# Patient Record
Sex: MALE | Race: WHITE | NOT HISPANIC OR LATINO | Employment: OTHER | URBAN - METROPOLITAN AREA
[De-identification: names, ages, dates, MRNs, and addresses within clinical notes are randomized per-mention and may not be internally consistent; named-entity substitution may affect disease eponyms.]

---

## 2023-02-09 PROBLEM — K21.9 GASTROESOPHAGEAL REFLUX DISEASE WITHOUT ESOPHAGITIS: Status: ACTIVE | Noted: 2023-02-09

## 2023-02-09 PROBLEM — L82.1 SEBORRHEIC KERATOSES: Status: ACTIVE | Noted: 2023-02-09

## 2023-02-09 PROBLEM — F41.9 ANXIETY, MILD: Status: ACTIVE | Noted: 2023-02-09

## 2023-02-09 PROBLEM — G43.009 MIGRAINE WITHOUT AURA AND WITHOUT STATUS MIGRAINOSUS, NOT INTRACTABLE: Status: ACTIVE | Noted: 2023-02-09

## 2023-02-09 PROBLEM — I10 ESSENTIAL HYPERTENSION: Status: ACTIVE | Noted: 2023-02-09

## 2023-02-09 PROBLEM — U07.1 COVID-19: Status: ACTIVE | Noted: 2023-02-09

## 2023-02-09 PROBLEM — M15.9 GENERALIZED OSTEOARTHRITIS OF MULTIPLE SITES: Status: ACTIVE | Noted: 2023-02-09

## 2023-02-09 PROBLEM — E66.3 OVERWEIGHT WITH BODY MASS INDEX (BMI) OF 29 TO 29.9 IN ADULT: Status: ACTIVE | Noted: 2023-02-09

## 2023-02-09 PROBLEM — E11.65 TYPE 2 DIABETES MELLITUS WITH HYPERGLYCEMIA, WITHOUT LONG-TERM CURRENT USE OF INSULIN (MULTI): Status: ACTIVE | Noted: 2023-02-09

## 2023-02-09 PROBLEM — E78.5 DYSLIPIDEMIA, GOAL LDL BELOW 100: Status: ACTIVE | Noted: 2023-02-09

## 2023-02-09 PROBLEM — M75.81 ROTATOR CUFF TENDONITIS, RIGHT: Status: ACTIVE | Noted: 2023-02-09

## 2023-02-09 PROBLEM — K63.5 BENIGN COLON POLYP: Status: ACTIVE | Noted: 2023-02-09

## 2023-02-09 PROBLEM — M75.82 TENDINITIS OF LEFT ROTATOR CUFF: Status: ACTIVE | Noted: 2023-02-09

## 2023-02-09 RX ORDER — ASPIRIN 81 MG/1
1 TABLET ORAL DAILY
COMMUNITY

## 2023-02-09 RX ORDER — OMEPRAZOLE 20 MG/1
20 CAPSULE, DELAYED RELEASE ORAL
COMMUNITY
Start: 2020-09-02

## 2023-02-09 RX ORDER — LOSARTAN POTASSIUM 100 MG/1
1 TABLET ORAL DAILY
COMMUNITY
Start: 2020-07-19 | End: 2023-09-22 | Stop reason: SDUPTHER

## 2023-02-09 RX ORDER — METFORMIN HYDROCHLORIDE 500 MG/1
1 TABLET ORAL
COMMUNITY
Start: 2020-09-08

## 2023-02-09 RX ORDER — SUMATRIPTAN SUCCINATE 25 MG/1
25 TABLET ORAL DAILY PRN
COMMUNITY

## 2023-02-09 RX ORDER — CHLORTHALIDONE 25 MG/1
1 TABLET ORAL DAILY
COMMUNITY
Start: 2020-09-08 | End: 2023-09-22 | Stop reason: SDUPTHER

## 2023-02-09 RX ORDER — ATORVASTATIN CALCIUM 10 MG/1
1 TABLET, FILM COATED ORAL DAILY
COMMUNITY
Start: 2020-07-19 | End: 2023-09-22 | Stop reason: SDUPTHER

## 2023-02-09 RX ORDER — SERTRALINE HYDROCHLORIDE 50 MG/1
1 TABLET, FILM COATED ORAL DAILY
COMMUNITY
Start: 2021-04-14 | End: 2024-01-22

## 2023-03-15 LAB
ALANINE AMINOTRANSFERASE (SGPT) (U/L) IN SER/PLAS: 7 U/L (ref 10–52)
ALBUMIN (G/DL) IN SER/PLAS: 4.3 G/DL (ref 3.4–5)
ALKALINE PHOSPHATASE (U/L) IN SER/PLAS: 79 U/L (ref 33–136)
ANION GAP IN SER/PLAS: 10 MMOL/L (ref 10–20)
ASPARTATE AMINOTRANSFERASE (SGOT) (U/L) IN SER/PLAS: 15 U/L (ref 9–39)
BILIRUBIN TOTAL (MG/DL) IN SER/PLAS: 1.1 MG/DL (ref 0–1.2)
CALCIUM (MG/DL) IN SER/PLAS: 9.6 MG/DL (ref 8.6–10.3)
CARBON DIOXIDE, TOTAL (MMOL/L) IN SER/PLAS: 31 MMOL/L (ref 21–32)
CHLORIDE (MMOL/L) IN SER/PLAS: 103 MMOL/L (ref 98–107)
CHOLESTEROL (MG/DL) IN SER/PLAS: 116 MG/DL (ref 0–199)
CHOLESTEROL IN HDL (MG/DL) IN SER/PLAS: 37 MG/DL
CHOLESTEROL/HDL RATIO: 3.1
COBALAMIN (VITAMIN B12) (PG/ML) IN SER/PLAS: 234 PG/ML (ref 211–911)
CREATININE (MG/DL) IN SER/PLAS: 0.99 MG/DL (ref 0.5–1.3)
ESTIMATED AVERAGE GLUCOSE FOR HBA1C: 134 MG/DL
GFR MALE: 79 ML/MIN/1.73M2
GLUCOSE (MG/DL) IN SER/PLAS: 124 MG/DL (ref 74–99)
HEMOGLOBIN A1C/HEMOGLOBIN TOTAL IN BLOOD: 6.3 %
LDL: 61 MG/DL (ref 0–99)
POTASSIUM (MMOL/L) IN SER/PLAS: 3.8 MMOL/L (ref 3.5–5.3)
PROTEIN TOTAL: 7 G/DL (ref 6.4–8.2)
SODIUM (MMOL/L) IN SER/PLAS: 140 MMOL/L (ref 136–145)
TRIGLYCERIDE (MG/DL) IN SER/PLAS: 88 MG/DL (ref 0–149)
UREA NITROGEN (MG/DL) IN SER/PLAS: 16 MG/DL (ref 6–23)
VLDL: 18 MG/DL (ref 0–40)

## 2023-03-22 ENCOUNTER — OFFICE VISIT (OUTPATIENT)
Dept: PRIMARY CARE | Facility: CLINIC | Age: 73
End: 2023-03-22
Payer: MEDICARE

## 2023-03-22 VITALS
WEIGHT: 188 LBS | DIASTOLIC BLOOD PRESSURE: 80 MMHG | HEIGHT: 66 IN | RESPIRATION RATE: 16 BRPM | BODY MASS INDEX: 30.22 KG/M2 | SYSTOLIC BLOOD PRESSURE: 130 MMHG | HEART RATE: 66 BPM

## 2023-03-22 DIAGNOSIS — G43.009 MIGRAINE WITHOUT AURA AND WITHOUT STATUS MIGRAINOSUS, NOT INTRACTABLE: ICD-10-CM

## 2023-03-22 DIAGNOSIS — K21.9 GASTROESOPHAGEAL REFLUX DISEASE WITHOUT ESOPHAGITIS: ICD-10-CM

## 2023-03-22 DIAGNOSIS — E78.5 DYSLIPIDEMIA, GOAL LDL BELOW 100: ICD-10-CM

## 2023-03-22 DIAGNOSIS — Z00.00 MEDICARE ANNUAL WELLNESS VISIT, SUBSEQUENT: Primary | ICD-10-CM

## 2023-03-22 DIAGNOSIS — E11.65 TYPE 2 DIABETES MELLITUS WITH HYPERGLYCEMIA, WITHOUT LONG-TERM CURRENT USE OF INSULIN (MULTI): ICD-10-CM

## 2023-03-22 DIAGNOSIS — I10 ESSENTIAL HYPERTENSION: ICD-10-CM

## 2023-03-22 DIAGNOSIS — M15.9 GENERALIZED OSTEOARTHRITIS OF MULTIPLE SITES: ICD-10-CM

## 2023-03-22 DIAGNOSIS — F41.9 ANXIETY, MILD: ICD-10-CM

## 2023-03-22 DIAGNOSIS — K63.5 BENIGN COLON POLYP: ICD-10-CM

## 2023-03-22 PROBLEM — E66.09 CLASS 1 OBESITY DUE TO EXCESS CALORIES WITH SERIOUS COMORBIDITY AND BODY MASS INDEX (BMI) OF 30.0 TO 30.9 IN ADULT: Status: ACTIVE | Noted: 2023-03-22

## 2023-03-22 PROBLEM — E66.3 OVERWEIGHT WITH BODY MASS INDEX (BMI) OF 29 TO 29.9 IN ADULT: Status: RESOLVED | Noted: 2023-02-09 | Resolved: 2023-03-22

## 2023-03-22 PROBLEM — E66.811 CLASS 1 OBESITY DUE TO EXCESS CALORIES WITH SERIOUS COMORBIDITY AND BODY MASS INDEX (BMI) OF 30.0 TO 30.9 IN ADULT: Status: ACTIVE | Noted: 2023-03-22

## 2023-03-22 PROCEDURE — G0442 ANNUAL ALCOHOL SCREEN 15 MIN: HCPCS | Performed by: INTERNAL MEDICINE

## 2023-03-22 PROCEDURE — 1170F FXNL STATUS ASSESSED: CPT | Performed by: INTERNAL MEDICINE

## 2023-03-22 PROCEDURE — 1036F TOBACCO NON-USER: CPT | Performed by: INTERNAL MEDICINE

## 2023-03-22 PROCEDURE — G0439 PPPS, SUBSEQ VISIT: HCPCS | Performed by: INTERNAL MEDICINE

## 2023-03-22 PROCEDURE — 99214 OFFICE O/P EST MOD 30 MIN: CPT | Performed by: INTERNAL MEDICINE

## 2023-03-22 PROCEDURE — G0447 BEHAVIOR COUNSEL OBESITY 15M: HCPCS | Performed by: INTERNAL MEDICINE

## 2023-03-22 PROCEDURE — 4010F ACE/ARB THERAPY RXD/TAKEN: CPT | Performed by: INTERNAL MEDICINE

## 2023-03-22 PROCEDURE — 99497 ADVNCD CARE PLAN 30 MIN: CPT | Performed by: INTERNAL MEDICINE

## 2023-03-22 PROCEDURE — 3079F DIAST BP 80-89 MM HG: CPT | Performed by: INTERNAL MEDICINE

## 2023-03-22 PROCEDURE — 1159F MED LIST DOCD IN RCRD: CPT | Performed by: INTERNAL MEDICINE

## 2023-03-22 PROCEDURE — 3075F SYST BP GE 130 - 139MM HG: CPT | Performed by: INTERNAL MEDICINE

## 2023-03-22 PROCEDURE — G0444 DEPRESSION SCREEN ANNUAL: HCPCS | Performed by: INTERNAL MEDICINE

## 2023-03-22 PROCEDURE — 3044F HG A1C LEVEL LT 7.0%: CPT | Performed by: INTERNAL MEDICINE

## 2023-03-22 PROCEDURE — 1160F RVW MEDS BY RX/DR IN RCRD: CPT | Performed by: INTERNAL MEDICINE

## 2023-03-22 ASSESSMENT — ANXIETY QUESTIONNAIRES
3. WORRYING TOO MUCH ABOUT DIFFERENT THINGS: NOT AT ALL
6. BECOMING EASILY ANNOYED OR IRRITABLE: NOT AT ALL
4. TROUBLE RELAXING: NOT AT ALL
2. NOT BEING ABLE TO STOP OR CONTROL WORRYING: NOT AT ALL
GAD7 TOTAL SCORE: 0
7. FEELING AFRAID AS IF SOMETHING AWFUL MIGHT HAPPEN: NOT AT ALL
1. FEELING NERVOUS, ANXIOUS, OR ON EDGE: NOT AT ALL
IF YOU CHECKED OFF ANY PROBLEMS ON THIS QUESTIONNAIRE, HOW DIFFICULT HAVE THESE PROBLEMS MADE IT FOR YOU TO DO YOUR WORK, TAKE CARE OF THINGS AT HOME, OR GET ALONG WITH OTHER PEOPLE: NOT DIFFICULT AT ALL
5. BEING SO RESTLESS THAT IT IS HARD TO SIT STILL: NOT AT ALL

## 2023-03-22 ASSESSMENT — ACTIVITIES OF DAILY LIVING (ADL)
MANAGING_FINANCES: INDEPENDENT
GROCERY_SHOPPING: INDEPENDENT
BATHING: INDEPENDENT
DOING_HOUSEWORK: INDEPENDENT
DRESSING: INDEPENDENT
TAKING_MEDICATION: INDEPENDENT

## 2023-03-22 ASSESSMENT — PATIENT HEALTH QUESTIONNAIRE - PHQ9
1. LITTLE INTEREST OR PLEASURE IN DOING THINGS: NOT AT ALL
SUM OF ALL RESPONSES TO PHQ9 QUESTIONS 1 AND 2: 0
2. FEELING DOWN, DEPRESSED OR HOPELESS: NOT AT ALL

## 2023-03-22 ASSESSMENT — ENCOUNTER SYMPTOMS
OCCASIONAL FEELINGS OF UNSTEADINESS: 0
DEPRESSION: 0
LOSS OF SENSATION IN FEET: 0

## 2023-03-22 NOTE — PROGRESS NOTES
Alcohol consumption becomes hazardous when consuming women [place] over 65 years old greater than 7 drinks per week or greater than 3 drinks per occasion for men greater than 14 drinks per week or greater than 4 drinks per occasion.  I spent 15 minutes screening for alcohol use.Depression and anxiety screening completed   PHQ9 score   GAD7 score   I spent 15 minutes obtaining and discussing depression screening using PHQ 2 questions with results documented in chart.  Screening using PHQ-9 and DESIRE-7 scores were used for follow-up with treatment and referral plan discussed.      I spent greater than 15 minutes face-to-face with individual providing recommendations for nutrition choices and exercise plan to help achieve weight reductionI spent 15 minutes face-to-face with this individual discussing the cardiovascular risk and behavioral therapies of nutritional choices exercise and elimination of habits contributing to risk .We agreed on a plan how they may be able to reduce  current cardiovascular risk.  Per patient with calculation greater than 10% aspirin use was discussed and encouraged unless known allergy or increased risk of bleeding contraindicates use patient's 10-year CV risk estimate calculates:I spent greater than 15 minutes discussing advance care planning including the explanation and discussion of advanced directives.  If patient does not have current up-to-date documents examples and information provided on how to create both living will and power of . [facility] tool[facility] was given to patient and was encouraged to work out completing these documents.Subjective   Reason for Visit: Jonathan Watts is an 73 y.o. male here for a Medicare Wellness visit.     Past Medical, Surgical, and Family History reviewed and updated in chart.    Reviewed all medications by prescribing practitioner or clinical pharmacist (such as prescriptions, OTCs, herbal therapies and supplements) and documented in the medical  "record.    HPI    Patient Care Team:  Luis Banegas DO as PCP - General  Luis Banegas DO as PCP - [employer] P[facility]     Review of Systems   All other systems reviewed and are negative.      Objective   Vitals:  /80 (BP Location: Right arm, Patient Position: Sitting)   Pulse 66   Resp 16   Ht 1.664 m (5' 5.5\")   Wt 85.3 kg (188 lb)   BMI 30.81 kg/m²       Physical Exam  Vitals and nursing note reviewed.   Constitutional:       General: He is not in acute distress.     Appearance: Normal appearance. He is well-developed. He is not toxic-appearing.   HENT:      Head: Normocephalic and atraumatic.      Right Ear: Tympanic membrane and external ear normal.      Left Ear: Tympanic membrane and external ear normal.      Nose: Nose normal.      Mouth/Throat:      Mouth: Mucous membranes are moist.      Pharynx: Oropharynx is clear. No oropharyngeal exudate or posterior oropharyngeal erythema.      Tonsils: No tonsillar exudate. 2+ on the right. 2+ on the left.   Eyes:      Extraocular Movements: Extraocular movements intact.      Conjunctiva/sclera: Conjunctivae normal.   Cardiovascular:      Rate and Rhythm: Normal rate and regular rhythm.      Pulses: Normal pulses.      Heart sounds: Normal heart sounds. No murmur heard.  Pulmonary:      Effort: Pulmonary effort is normal.      Breath sounds: Normal breath sounds.   Abdominal:      General: Abdomen is flat. Bowel sounds are normal.      Palpations: Abdomen is soft.   Musculoskeletal:      Cervical back: Neck supple.   Feet:      Right foot:      Skin integrity: Skin integrity normal. No ulcer, blister, skin breakdown, erythema, warmth or callus.      Toenail Condition: Right toenails are normal.      Left foot:      Skin integrity: Skin integrity normal. No ulcer, blister, skin breakdown, erythema, warmth or callus.      Toenail Condition: Left toenails are normal.   Lymphadenopathy:      Cervical: No cervical adenopathy.   Skin:     " General: Skin is warm and dry.      Capillary Refill: Capillary refill takes more than 3 seconds.      Findings: No rash.   Neurological:      Mental Status: He is alert. Mental status is at baseline.      Sensory: Sensation is intact.   Psychiatric:         Mood and Affect: Mood normal.         Behavior: Behavior normal.         Thought Content: Thought content normal.         Judgment: Judgment normal.       Assessment/Plan   Problem List Items Addressed This Visit          Circulatory    Essential hypertension       Digestive    Benign colon polyp    Gastroesophageal reflux disease without esophagitis       Musculoskeletal    Generalized osteoarthritis of multiple sites       Endocrine/Metabolic    Type 2 diabetes mellitus with hyperglycemia, without long-term current use of insulin (CMS/[facility])       Other    Anxiety, mild    Dyslipidemia, goal LDL below 100    Migraine without aura and without status migrainosus, not intractable    Medicare annual wellness visit, subsequent - Primary

## 2023-03-22 NOTE — PROGRESS NOTES
"Subjective   Reason for Visit: Jonathan Watts is an 73 y.o. male here for a Medicare Wellness visit.     Past Medical, Surgical, and Family History reviewed and updated in chart.    Reviewed all medications by prescribing practitioner or clinical pharmacist (such as prescriptions, OTCs, herbal therapies and supplements) and documented in the medical record.    HPI    Patient Care Team:  Luis Banegas DO as PCP - General  Luis Banegas DO as PCP - [employer] P[facility]     Review of Systems    Objective   Vitals:  /80 (BP Location: Right arm, Patient Position: Sitting)   Pulse 66   Resp 16   Ht 1.664 m (5' 5.5\")   Wt 85.3 kg (188 lb)   BMI 30.81 kg/m²       Physical Exam    Assessment/Plan   Problem List Items Addressed This Visit    None         "

## 2023-09-18 ENCOUNTER — LAB (OUTPATIENT)
Dept: LAB | Facility: LAB | Age: 73
End: 2023-09-18
Payer: MEDICARE

## 2023-09-18 DIAGNOSIS — I10 ESSENTIAL HYPERTENSION: ICD-10-CM

## 2023-09-18 DIAGNOSIS — M15.9 GENERALIZED OSTEOARTHRITIS OF MULTIPLE SITES: ICD-10-CM

## 2023-09-18 DIAGNOSIS — E11.65 TYPE 2 DIABETES MELLITUS WITH HYPERGLYCEMIA, WITHOUT LONG-TERM CURRENT USE OF INSULIN (MULTI): ICD-10-CM

## 2023-09-18 DIAGNOSIS — K21.9 GASTROESOPHAGEAL REFLUX DISEASE WITHOUT ESOPHAGITIS: ICD-10-CM

## 2023-09-18 DIAGNOSIS — E78.5 DYSLIPIDEMIA, GOAL LDL BELOW 100: ICD-10-CM

## 2023-09-18 DIAGNOSIS — Z00.00 MEDICARE ANNUAL WELLNESS VISIT, SUBSEQUENT: ICD-10-CM

## 2023-09-18 LAB
ALANINE AMINOTRANSFERASE (SGPT) (U/L) IN SER/PLAS: 8 U/L (ref 10–52)
ALBUMIN (G/DL) IN SER/PLAS: 4.3 G/DL (ref 3.4–5)
ALBUMIN (MG/L) IN URINE: 10.6 MG/L
ALBUMIN/CREATININE (UG/MG) IN URINE: 5.8 UG/MG CRT (ref 0–30)
ALKALINE PHOSPHATASE (U/L) IN SER/PLAS: 83 U/L (ref 33–136)
ANION GAP IN SER/PLAS: 10 MMOL/L (ref 10–20)
ASPARTATE AMINOTRANSFERASE (SGOT) (U/L) IN SER/PLAS: 16 U/L (ref 9–39)
BILIRUBIN TOTAL (MG/DL) IN SER/PLAS: 1.3 MG/DL (ref 0–1.2)
CALCIUM (MG/DL) IN SER/PLAS: 9.3 MG/DL (ref 8.6–10.3)
CARBON DIOXIDE, TOTAL (MMOL/L) IN SER/PLAS: 30 MMOL/L (ref 21–32)
CHLORIDE (MMOL/L) IN SER/PLAS: 103 MMOL/L (ref 98–107)
CHOLESTEROL (MG/DL) IN SER/PLAS: 113 MG/DL (ref 0–199)
CHOLESTEROL IN HDL (MG/DL) IN SER/PLAS: 35.5 MG/DL
CHOLESTEROL/HDL RATIO: 3.2
CREATININE (MG/DL) IN SER/PLAS: 1.05 MG/DL (ref 0.5–1.3)
CREATININE (MG/DL) IN URINE: 182 MG/DL (ref 20–370)
ESTIMATED AVERAGE GLUCOSE FOR HBA1C: 131 MG/DL
GFR MALE: 74 ML/MIN/1.73M2
GLUCOSE (MG/DL) IN SER/PLAS: 110 MG/DL (ref 74–99)
HEMOGLOBIN A1C/HEMOGLOBIN TOTAL IN BLOOD: 6.2 %
HEPATITIS C VIRUS AB PRESENCE IN SERUM: NONREACTIVE
LDL: 54 MG/DL (ref 0–99)
POTASSIUM (MMOL/L) IN SER/PLAS: 4.4 MMOL/L (ref 3.5–5.3)
PROTEIN TOTAL: 6.7 G/DL (ref 6.4–8.2)
SODIUM (MMOL/L) IN SER/PLAS: 139 MMOL/L (ref 136–145)
TRIGLYCERIDE (MG/DL) IN SER/PLAS: 117 MG/DL (ref 0–149)
UREA NITROGEN (MG/DL) IN SER/PLAS: 16 MG/DL (ref 6–23)
VLDL: 23 MG/DL (ref 0–40)

## 2023-09-18 PROCEDURE — 82043 UR ALBUMIN QUANTITATIVE: CPT

## 2023-09-18 PROCEDURE — 36415 COLL VENOUS BLD VENIPUNCTURE: CPT

## 2023-09-18 PROCEDURE — 86803 HEPATITIS C AB TEST: CPT

## 2023-09-18 PROCEDURE — 80061 LIPID PANEL: CPT

## 2023-09-18 PROCEDURE — 82570 ASSAY OF URINE CREATININE: CPT

## 2023-09-18 PROCEDURE — 83036 HEMOGLOBIN GLYCOSYLATED A1C: CPT

## 2023-09-18 PROCEDURE — 80053 COMPREHEN METABOLIC PANEL: CPT

## 2023-09-22 ENCOUNTER — OFFICE VISIT (OUTPATIENT)
Dept: PRIMARY CARE | Facility: CLINIC | Age: 73
End: 2023-09-22
Payer: MEDICARE

## 2023-09-22 VITALS
HEIGHT: 66 IN | HEART RATE: 68 BPM | BODY MASS INDEX: 28.12 KG/M2 | DIASTOLIC BLOOD PRESSURE: 70 MMHG | WEIGHT: 175 LBS | SYSTOLIC BLOOD PRESSURE: 110 MMHG

## 2023-09-22 DIAGNOSIS — G43.009 MIGRAINE WITHOUT AURA AND WITHOUT STATUS MIGRAINOSUS, NOT INTRACTABLE: ICD-10-CM

## 2023-09-22 DIAGNOSIS — F41.9 ANXIETY, MILD: ICD-10-CM

## 2023-09-22 DIAGNOSIS — E78.5 DYSLIPIDEMIA, GOAL LDL BELOW 100: ICD-10-CM

## 2023-09-22 DIAGNOSIS — E11.65 TYPE 2 DIABETES MELLITUS WITH HYPERGLYCEMIA, WITHOUT LONG-TERM CURRENT USE OF INSULIN (MULTI): ICD-10-CM

## 2023-09-22 DIAGNOSIS — K21.9 GASTROESOPHAGEAL REFLUX DISEASE WITHOUT ESOPHAGITIS: ICD-10-CM

## 2023-09-22 DIAGNOSIS — Z00.00 MEDICARE ANNUAL WELLNESS VISIT, SUBSEQUENT: ICD-10-CM

## 2023-09-22 DIAGNOSIS — Z23 FLU VACCINE NEED: ICD-10-CM

## 2023-09-22 DIAGNOSIS — I10 ESSENTIAL HYPERTENSION: Primary | ICD-10-CM

## 2023-09-22 PROBLEM — E66.09 CLASS 1 OBESITY DUE TO EXCESS CALORIES WITH SERIOUS COMORBIDITY AND BODY MASS INDEX (BMI) OF 30.0 TO 30.9 IN ADULT: Status: RESOLVED | Noted: 2023-03-22 | Resolved: 2023-09-22

## 2023-09-22 PROBLEM — E66.811 CLASS 1 OBESITY DUE TO EXCESS CALORIES WITH SERIOUS COMORBIDITY AND BODY MASS INDEX (BMI) OF 30.0 TO 30.9 IN ADULT: Status: RESOLVED | Noted: 2023-03-22 | Resolved: 2023-09-22

## 2023-09-22 PROCEDURE — 3078F DIAST BP <80 MM HG: CPT | Performed by: INTERNAL MEDICINE

## 2023-09-22 PROCEDURE — 90662 IIV NO PRSV INCREASED AG IM: CPT | Performed by: INTERNAL MEDICINE

## 2023-09-22 PROCEDURE — G0008 ADMIN INFLUENZA VIRUS VAC: HCPCS | Performed by: INTERNAL MEDICINE

## 2023-09-22 PROCEDURE — 1159F MED LIST DOCD IN RCRD: CPT | Performed by: INTERNAL MEDICINE

## 2023-09-22 PROCEDURE — 4010F ACE/ARB THERAPY RXD/TAKEN: CPT | Performed by: INTERNAL MEDICINE

## 2023-09-22 PROCEDURE — 99213 OFFICE O/P EST LOW 20 MIN: CPT | Performed by: INTERNAL MEDICINE

## 2023-09-22 PROCEDURE — 3044F HG A1C LEVEL LT 7.0%: CPT | Performed by: INTERNAL MEDICINE

## 2023-09-22 PROCEDURE — 1036F TOBACCO NON-USER: CPT | Performed by: INTERNAL MEDICINE

## 2023-09-22 PROCEDURE — 3074F SYST BP LT 130 MM HG: CPT | Performed by: INTERNAL MEDICINE

## 2023-09-22 PROCEDURE — 1160F RVW MEDS BY RX/DR IN RCRD: CPT | Performed by: INTERNAL MEDICINE

## 2023-09-22 RX ORDER — LOSARTAN POTASSIUM 100 MG/1
100 TABLET ORAL DAILY
Qty: 90 TABLET | Refills: 3 | Status: SHIPPED | OUTPATIENT
Start: 2023-09-22

## 2023-09-22 RX ORDER — CHLORTHALIDONE 25 MG/1
25 TABLET ORAL DAILY
Qty: 90 TABLET | Refills: 3 | Status: SHIPPED | OUTPATIENT
Start: 2023-09-22

## 2023-09-22 RX ORDER — ATORVASTATIN CALCIUM 10 MG/1
10 TABLET, FILM COATED ORAL DAILY
Qty: 90 TABLET | Refills: 3 | Status: SHIPPED | OUTPATIENT
Start: 2023-09-22

## 2023-09-22 NOTE — ASSESSMENT & PLAN NOTE
Stable on omeprazole 20 mg daily mother with history of gastric cancer patient denies dyspepsia dysphagia or unintended weight loss appetite good previous EGD unremarkable

## 2023-09-22 NOTE — ASSESSMENT & PLAN NOTE
Patient improved BMI from 30-28 through diet exercise and lifestyle changes goal weight less than 170 pounds continue

## 2023-09-22 NOTE — ASSESSMENT & PLAN NOTE
Hemoglobin A1c stable at 6.2 without evidence of microalbuminuria Continue metformin 500 mg twice a day reevaluate in 6 months

## 2023-09-22 NOTE — PROGRESS NOTES
"Subjective   Patient ID: Jonathan Watts is a 73 y.o. male who presents for Follow-up.    HPI     Review of Systems    Objective   /70 (BP Location: Left arm, Patient Position: Sitting)   Pulse 68   Ht 1.664 m (5' 5.5\")   Wt 79.4 kg (175 lb)   BMI 28.68 kg/m²     Physical Exam    Assessment/Plan          "

## 2023-09-22 NOTE — PROGRESS NOTES
"Subjective   Reason for Visit: Jonathan Watts is an 73 y.o. male here for a fu  visit.     Past Medical, Surgical, and Family History reviewed and updated in chart.    Reviewed all medications by prescribing practitioner or clinical pharmacist (such as prescriptions, OTCs, herbal therapies and supplements) and documented in the medical record.    [facility]    Patient Care Team:  Luis Banegas DO as PCP - General  Luis Banegas DO as PCP - [employer] PCP     Review of Systems   All other systems reviewed and are negative.      Objective   Vitals:  /70 (BP Location: Left arm, Patient Position: Sitting)   Pulse 68   Ht 1.664 m (5' 5.5\")   Wt 79.4 kg (175 lb)   BMI 28.68 kg/m²       Physical Exam  Vitals and nursing note reviewed.   Constitutional:       General: He is not in acute distress.     Appearance: Normal appearance. He is well-developed. He is not toxic-appearing.   HENT:      Head: Normocephalic and atraumatic.      Right Ear: Tympanic membrane and external ear normal.      Left Ear: Tympanic membrane and external ear normal.      Nose: Nose normal.      Mouth/Throat:      Mouth: Mucous membranes are moist.      Pharynx: Oropharynx is clear. No oropharyngeal exudate or posterior oropharyngeal erythema.      Tonsils: No tonsillar exudate. 2+ on the right. 2+ on the left.   Eyes:      Extraocular Movements: Extraocular movements intact.      Conjunctiva/sclera: Conjunctivae normal.   Cardiovascular:      Rate and Rhythm: Normal rate and regular rhythm.      Pulses: Normal pulses.      Heart sounds: Normal heart sounds. No murmur heard.  Pulmonary:      Effort: Pulmonary effort is normal.      Breath sounds: Normal breath sounds.   Abdominal:      General: Abdomen is flat. Bowel sounds are normal.      Palpations: Abdomen is soft.   Musculoskeletal:      Cervical back: Neck supple.   Feet:      Right foot:      Skin integrity: Skin integrity normal. No ulcer, blister, skin breakdown, " erythema, warmth or callus.      Toenail Condition: Right toenails are normal.      Left foot:      Skin integrity: Skin integrity normal. No ulcer, blister, skin breakdown, erythema, warmth or callus.      Toenail Condition: Left toenails are normal.   Lymphadenopathy:      Cervical: No cervical adenopathy.   Skin:     General: Skin is warm and dry.      Capillary Refill: Capillary refill takes more than 3 seconds.      Findings: No rash.   Neurological:      Mental Status: He is alert. Mental status is at baseline.      Sensory: Sensation is intact.   Psychiatric:         Mood and Affect: Mood normal.         Behavior: Behavior normal.         Thought Content: Thought content normal.         Judgment: Judgment normal.         Assessment/Plan   Problem List Items Addressed This Visit       Anxiety, mild    Current Assessment & Plan     Stable continue sertraline 50 mg 1 tablet daily         Dyslipidemia, goal LDL below 100    Current Assessment & Plan     Optimal LDL cholesterol of 54 triglycerides 117 HDL 35 continue atorvastatin 10 mg daily         Relevant Medications    atorvastatin (Lipitor) 10 mg tablet    Other Relevant Orders    Comprehensive Metabolic Panel    Hemoglobin A1C    Lipid Panel    Albumin , Urine Random    Follow Up In [facility] - PCP - Established    Essential hypertension - Primary    Current Assessment & Plan     Optimal blood pressure control continue losartan 100 mg daily with chlorthalidone 25 mg daily continue baby aspirin for primary prevention and stroke prophylaxis in the setting of diabetes mellitus         Relevant Medications    atorvastatin (Lipitor) 10 mg tablet    chlorthalidone (Hygroton) 25 mg tablet    losartan (Cozaar) 100 mg tablet    Other Relevant Orders    Comprehensive Metabolic Panel    Hemoglobin A1C    Lipid Panel    Albumin , Urine Random    Follow Up In [facility] - PCP - Established    Gastroesophageal reflux disease without esophagitis     Current Assessment & Plan     Stable on omeprazole 20 mg daily mother with history of gastric cancer patient denies dyspepsia dysphagia or unintended weight loss appetite good previous EGD unremarkable         Relevant Orders    Comprehensive Metabolic Panel    Hemoglobin A1C    Lipid Panel    Albumin , Urine Random    Follow Up In [facility] - PCP - Established    Migraine without aura and without status migrainosus, not intractable    Current Assessment & Plan     Stable as needed use of low-dose sumatriptan         Type 2 diabetes mellitus with hyperglycemia, without long-term current use of insulin (CMS/[facility])    Current Assessment & Plan     Hemoglobin A1c stable at 6.2 without evidence of microalbuminuria Continue metformin 500 mg twice a day reevaluate in 6 months         Relevant Orders    Comprehensive Metabolic Panel    Hemoglobin A1C    Lipid Panel    Albumin , Urine Random    Follow Up In [facility] - PCP - [facility]    Adult body mass index 28.0-28.9    Current Assessment & Plan     Patient improved BMI from 30-28 through diet exercise and lifestyle changes goal weight less than 170 pounds continue         Relevant Orders    Comprehensive Metabolic Panel    Hemoglobin A1C    Lipid Panel    Albumin , Urine Random    Follow Up In [facility] - PCP - Established Medicare annual wellness visit, subsequent    Current Assessment & Plan     Up-to-date with vaccinations          Other Visit Diagnoses       Flu vaccine need        Relevant Medications    atorvastatin (Lipitor) 10 mg tablet    chlorthalidone (Hygroton) 25 mg tablet    losartan (Cozaar) 100 mg tablet    Other Relevant Orders    Flu vaccine, quadrivalent, high-dose, preservative free, age 65y+ (FLUZONE) (Completed)    Comprehensive Metabolic Panel    Hemoglobin A1C    Lipid Panel    Albumin , Urine Random    Follow Up In [facility] - PCP - [facility]

## 2023-09-22 NOTE — ASSESSMENT & PLAN NOTE
Rectal polyp removed 5 mm in 2021 okay for 5-year surveillance does not require repeat Cologuard testing reevaluate colonoscopy in 2026 completed by Dr. Epstein of gastroenterology

## 2023-09-22 NOTE — ASSESSMENT & PLAN NOTE
Optimal blood pressure control continue losartan 100 mg daily with chlorthalidone 25 mg daily continue baby aspirin for primary prevention and stroke prophylaxis in the setting of diabetes mellitus

## 2023-12-05 ENCOUNTER — TELEPHONE (OUTPATIENT)
Dept: PRIMARY CARE | Facility: CLINIC | Age: 73
End: 2023-12-05
Payer: MEDICARE

## 2023-12-05 NOTE — TELEPHONE ENCOUNTER
Chief Complaint:    Symptoms: bad cough, stuffed nose, drainage    Duration: week    Treatments: mucinex    Patient Requesting: medications Recommendations    Covid Test: 2 x negative    Did patient have Covid Vaccine?    Recent covid booster:      Pharmacy:  [facility]

## 2023-12-08 ENCOUNTER — OFFICE VISIT (OUTPATIENT)
Dept: PRIMARY CARE | Facility: CLINIC | Age: 73
End: 2023-12-08
Payer: MEDICARE

## 2023-12-08 VITALS
BODY MASS INDEX: 29.16 KG/M2 | HEIGHT: 65 IN | DIASTOLIC BLOOD PRESSURE: 60 MMHG | SYSTOLIC BLOOD PRESSURE: 122 MMHG | HEART RATE: 64 BPM | WEIGHT: 175 LBS

## 2023-12-08 DIAGNOSIS — J40 BRONCHITIS: Primary | ICD-10-CM

## 2023-12-08 PROCEDURE — 3044F HG A1C LEVEL LT 7.0%: CPT | Performed by: INTERNAL MEDICINE

## 2023-12-08 PROCEDURE — 3074F SYST BP LT 130 MM HG: CPT | Performed by: INTERNAL MEDICINE

## 2023-12-08 PROCEDURE — 4010F ACE/ARB THERAPY RXD/TAKEN: CPT | Performed by: INTERNAL MEDICINE

## 2023-12-08 PROCEDURE — 99213 OFFICE O/P EST LOW 20 MIN: CPT | Performed by: INTERNAL MEDICINE

## 2023-12-08 PROCEDURE — 1159F MED LIST DOCD IN RCRD: CPT | Performed by: INTERNAL MEDICINE

## 2023-12-08 PROCEDURE — 1160F RVW MEDS BY RX/DR IN RCRD: CPT | Performed by: INTERNAL MEDICINE

## 2023-12-08 PROCEDURE — 1036F TOBACCO NON-USER: CPT | Performed by: INTERNAL MEDICINE

## 2023-12-08 PROCEDURE — 3078F DIAST BP <80 MM HG: CPT | Performed by: INTERNAL MEDICINE

## 2023-12-08 RX ORDER — ALBUTEROL SULFATE 90 UG/1
2 AEROSOL, METERED RESPIRATORY (INHALATION) EVERY 4 HOURS PRN
Qty: 8 G | Refills: 5 | Status: SHIPPED | OUTPATIENT
Start: 2023-12-08 | End: 2024-12-07

## 2023-12-08 ASSESSMENT — ENCOUNTER SYMPTOMS
RHINORRHEA: 1
CHEST TIGHTNESS: 1
COUGH: 1
SORE THROAT: 1

## 2023-12-08 NOTE — PROGRESS NOTES
"Subjective   Reason for Visit: Jonathan Watts is an 73 y.o. male here for a ACUTE visit.     Past Medical, Surgical, and Family History reviewed and updated in chart.         Sinusitis  This is a new problem. The current episode started in the past 7 days. The problem has been gradually improving since onset. There has been no fever. His pain is at a severity of 0/10. He is experiencing no pain. Associated symptoms include coughing and a sore throat. Past treatments include nothing. The treatment provided no relief.       Patient Care Team:  Luis Banegas DO as PCP - General  Luis Banegas DO as PCP - [employer] PCP     Review of Systems   HENT:  Positive for rhinorrhea and sore throat.    Respiratory:  Positive for cough and chest tightness.    All other systems reviewed and are negative.      Objective   Vitals:  /60 (BP Location: Right arm, Patient Position: Sitting)   Pulse 64   Ht 1.651 m (5' 5\")   Wt 79.4 kg (175 lb)   BMI 29.12 kg/m²       Physical Exam  Vitals and nursing note reviewed.   Constitutional:       General: He is not in acute distress.     Appearance: Normal appearance. He is well-developed. He is not toxic-appearing.   HENT:      Head: Normocephalic and atraumatic.      Right Ear: Tympanic membrane and external ear normal.      Left Ear: Tympanic membrane and external ear normal.      Nose: Congestion present.      Mouth/Throat:      Mouth: Mucous membranes are moist.      Pharynx: Oropharynx is clear. No oropharyngeal exudate or posterior oropharyngeal erythema.      Tonsils: No tonsillar exudate. 2+ on the right. 2+ on the left.   Eyes:      Extraocular Movements: Extraocular movements intact.      Conjunctiva/sclera: Conjunctivae normal.   Cardiovascular:      Rate and Rhythm: Normal rate and regular rhythm.      Pulses: Normal pulses.      Heart sounds: Normal heart sounds. No murmur heard.  Pulmonary:      Effort: Pulmonary effort is normal.      Breath " sounds: Normal breath sounds.   Abdominal:      General: Abdomen is flat. Bowel sounds are normal.      Palpations: Abdomen is soft.   Musculoskeletal:      Cervical back: Neck supple.   Feet:      Right foot:      Skin integrity: Skin integrity normal. No ulcer, blister, skin breakdown, erythema, warmth or callus.      Toenail Condition: Right toenails are normal.      Left foot:      Skin integrity: Skin integrity normal. No ulcer, blister, skin breakdown, erythema, warmth or callus.      Toenail Condition: Left toenails are normal.   Lymphadenopathy:      Cervical: No cervical adenopathy.   Skin:     General: Skin is warm and dry.      Capillary Refill: Capillary refill takes more than 3 seconds.      Findings: No rash.   Neurological:      Mental Status: He is alert. Mental status is at baseline.      Sensory: Sensation is intact.   Psychiatric:         Mood and Affect: Mood normal.         Behavior: Behavior normal.         Thought Content: Thought content normal.         Judgment: Judgment normal.         Assessment/Plan   Problem List Items Addressed This Visit       Bronchitis - Primary    Current Assessment & Plan     Mild upper respiratory and lower respiratory tract symptoms appears to be slowly improvingViral in nature will prescribe albuterol 2 puffs every 4-6 hours as needed for chest tightness wheezing or shortness of breath no indication for antibiotics at this time         Relevant Medications    albuterol (Ventolin HFA) 90 mcg/actuation inhaler

## 2023-12-17 PROBLEM — J40 BRONCHITIS: Status: ACTIVE | Noted: 2023-12-17

## 2023-12-17 NOTE — ASSESSMENT & PLAN NOTE
Mild upper respiratory and lower respiratory tract symptoms appears to be slowly improvingViral in nature will prescribe albuterol 2 puffs every 4-6 hours as needed for chest tightness wheezing or shortness of breath no indication for antibiotics at this time

## 2024-01-21 DIAGNOSIS — F41.9 ANXIETY DISORDER, UNSPECIFIED: ICD-10-CM

## 2024-01-22 RX ORDER — SERTRALINE HYDROCHLORIDE 50 MG/1
50 TABLET, FILM COATED ORAL DAILY
Qty: 90 TABLET | Refills: 3 | Status: SHIPPED | OUTPATIENT
Start: 2024-01-22

## 2024-01-30 ENCOUNTER — TELEPHONE (OUTPATIENT)
Dept: PRIMARY CARE | Facility: CLINIC | Age: 74
End: 2024-01-30
Payer: MEDICARE

## 2024-01-30 NOTE — TELEPHONE ENCOUNTER
Patient has been experiencing nasal congestion for a couple weeks and is asking if a antibiotic can be prescribe. Was told to call back if not feeling better at last visit     Currently taking oct nasal spray and is drying out throat     [facility]/pharmacy #[id number] - [place], [place] - [address] [place] [address] AT CORNER OF [address] [address]  Phone: [phone]  Fax: [phone]

## 2024-03-05 ENCOUNTER — TELEPHONE (OUTPATIENT)
Dept: PRIMARY CARE | Facility: CLINIC | Age: 74
End: 2024-03-05
Payer: MEDICARE

## 2024-03-05 NOTE — TELEPHONE ENCOUNTER
Drake called to say his right hand won't close. He can bend the fingers a little bit. There is some pain in his hand and wrist. He said he hasn't hurt his wrist or hand. There is a bump on the inside of his wrist it's been going on for 3-4 weeks. He was trying to wait for his appointment 3/22. He wants to get in sooner if possible. Please advise.

## 2024-03-06 NOTE — TELEPHONE ENCOUNTER
Patient is requesting if anything can be sent over for the pain please advise      [id number] - [place], [place] - [address] [facility] AT CORNER OF [address] [address]  Phone: [phone]  Fax: [phone]

## 2024-03-18 ENCOUNTER — LAB (OUTPATIENT)
Dept: LAB | Facility: LAB | Age: 74
End: 2024-03-18
Payer: MEDICARE

## 2024-03-18 DIAGNOSIS — K21.9 GASTROESOPHAGEAL REFLUX DISEASE WITHOUT ESOPHAGITIS: ICD-10-CM

## 2024-03-18 DIAGNOSIS — I10 ESSENTIAL HYPERTENSION: ICD-10-CM

## 2024-03-18 DIAGNOSIS — E11.65 TYPE 2 DIABETES MELLITUS WITH HYPERGLYCEMIA, WITHOUT LONG-TERM CURRENT USE OF INSULIN (MULTI): ICD-10-CM

## 2024-03-18 DIAGNOSIS — E78.5 DYSLIPIDEMIA, GOAL LDL BELOW 100: ICD-10-CM

## 2024-03-18 DIAGNOSIS — Z23 FLU VACCINE NEED: ICD-10-CM

## 2024-03-18 LAB
ALBUMIN SERPL BCP-MCNC: 4.4 G/DL (ref 3.4–5)
ALP SERPL-CCNC: 91 U/L (ref 33–136)
ALT SERPL W P-5'-P-CCNC: 8 U/L (ref 10–52)
ANION GAP SERPL CALC-SCNC: 13 MMOL/L (ref 10–20)
AST SERPL W P-5'-P-CCNC: 15 U/L (ref 9–39)
BILIRUB SERPL-MCNC: 1 MG/DL (ref 0–1.2)
BUN SERPL-MCNC: 18 MG/DL (ref 6–23)
CALCIUM SERPL-MCNC: 9.5 MG/DL (ref 8.6–10.3)
CHLORIDE SERPL-SCNC: 103 MMOL/L (ref 98–107)
CHOLEST SERPL-MCNC: 121 MG/DL (ref 0–199)
CHOLESTEROL/HDL RATIO: 3.3
CO2 SERPL-SCNC: 28 MMOL/L (ref 21–32)
CREAT SERPL-MCNC: 1.12 MG/DL (ref 0.5–1.3)
CREAT UR-MCNC: 261.4 MG/DL (ref 20–370)
EGFRCR SERPLBLD CKD-EPI 2021: 68 ML/MIN/1.73M*2
EST. AVERAGE GLUCOSE BLD GHB EST-MCNC: 151 MG/DL
GLUCOSE SERPL-MCNC: 130 MG/DL (ref 74–99)
HBA1C MFR BLD: 6.9 %
HDLC SERPL-MCNC: 36.5 MG/DL
LDLC SERPL CALC-MCNC: 56 MG/DL
MICROALBUMIN UR-MCNC: 15 MG/L
MICROALBUMIN/CREAT UR: 5.7 UG/MG CREAT
NON HDL CHOLESTEROL: 85 MG/DL (ref 0–149)
POTASSIUM SERPL-SCNC: 4.2 MMOL/L (ref 3.5–5.3)
PROT SERPL-MCNC: 6.8 G/DL (ref 6.4–8.2)
SODIUM SERPL-SCNC: 140 MMOL/L (ref 136–145)
TRIGL SERPL-MCNC: 142 MG/DL (ref 0–149)
VLDL: 28 MG/DL (ref 0–40)

## 2024-03-18 PROCEDURE — 36415 COLL VENOUS BLD VENIPUNCTURE: CPT

## 2024-03-18 PROCEDURE — 82043 UR ALBUMIN QUANTITATIVE: CPT

## 2024-03-18 PROCEDURE — 82570 ASSAY OF URINE CREATININE: CPT

## 2024-03-18 PROCEDURE — 83036 HEMOGLOBIN GLYCOSYLATED A1C: CPT

## 2024-03-18 PROCEDURE — 80061 LIPID PANEL: CPT

## 2024-03-18 PROCEDURE — 80053 COMPREHEN METABOLIC PANEL: CPT

## 2024-03-22 ENCOUNTER — OFFICE VISIT (OUTPATIENT)
Dept: PRIMARY CARE | Facility: CLINIC | Age: 74
End: 2024-03-22
Payer: MEDICARE

## 2024-03-22 VITALS
DIASTOLIC BLOOD PRESSURE: 60 MMHG | HEART RATE: 68 BPM | BODY MASS INDEX: 29.49 KG/M2 | SYSTOLIC BLOOD PRESSURE: 118 MMHG | WEIGHT: 177 LBS | HEIGHT: 65 IN

## 2024-03-22 DIAGNOSIS — F41.9 ANXIETY, MILD: ICD-10-CM

## 2024-03-22 DIAGNOSIS — E78.5 DYSLIPIDEMIA ASSOCIATED WITH TYPE 2 DIABETES MELLITUS (MULTI): ICD-10-CM

## 2024-03-22 DIAGNOSIS — K21.9 GASTROESOPHAGEAL REFLUX DISEASE WITHOUT ESOPHAGITIS: ICD-10-CM

## 2024-03-22 DIAGNOSIS — G56.03 BILATERAL CARPAL TUNNEL SYNDROME: ICD-10-CM

## 2024-03-22 DIAGNOSIS — Z00.00 MEDICARE ANNUAL WELLNESS VISIT, SUBSEQUENT: Primary | ICD-10-CM

## 2024-03-22 DIAGNOSIS — E11.65 TYPE 2 DIABETES MELLITUS WITH HYPERGLYCEMIA, WITHOUT LONG-TERM CURRENT USE OF INSULIN (MULTI): ICD-10-CM

## 2024-03-22 DIAGNOSIS — I10 ESSENTIAL HYPERTENSION: ICD-10-CM

## 2024-03-22 DIAGNOSIS — E11.69 DYSLIPIDEMIA ASSOCIATED WITH TYPE 2 DIABETES MELLITUS (MULTI): ICD-10-CM

## 2024-03-22 PROBLEM — J40 BRONCHITIS: Status: RESOLVED | Noted: 2023-12-17 | Resolved: 2024-03-22

## 2024-03-22 PROCEDURE — 3074F SYST BP LT 130 MM HG: CPT | Performed by: INTERNAL MEDICINE

## 2024-03-22 PROCEDURE — G0439 PPPS, SUBSEQ VISIT: HCPCS | Performed by: INTERNAL MEDICINE

## 2024-03-22 PROCEDURE — G0446 INTENS BEHAVE THER CARDIO DX: HCPCS | Performed by: INTERNAL MEDICINE

## 2024-03-22 PROCEDURE — G0444 DEPRESSION SCREEN ANNUAL: HCPCS | Performed by: INTERNAL MEDICINE

## 2024-03-22 PROCEDURE — 1160F RVW MEDS BY RX/DR IN RCRD: CPT | Performed by: INTERNAL MEDICINE

## 2024-03-22 PROCEDURE — 1170F FXNL STATUS ASSESSED: CPT | Performed by: INTERNAL MEDICINE

## 2024-03-22 PROCEDURE — 99214 OFFICE O/P EST MOD 30 MIN: CPT | Performed by: INTERNAL MEDICINE

## 2024-03-22 PROCEDURE — 1159F MED LIST DOCD IN RCRD: CPT | Performed by: INTERNAL MEDICINE

## 2024-03-22 PROCEDURE — 99497 ADVNCD CARE PLAN 30 MIN: CPT | Performed by: INTERNAL MEDICINE

## 2024-03-22 PROCEDURE — 1036F TOBACCO NON-USER: CPT | Performed by: INTERNAL MEDICINE

## 2024-03-22 PROCEDURE — 3078F DIAST BP <80 MM HG: CPT | Performed by: INTERNAL MEDICINE

## 2024-03-22 ASSESSMENT — ANXIETY QUESTIONNAIRES
6. BECOMING EASILY ANNOYED OR IRRITABLE: NOT AT ALL
2. NOT BEING ABLE TO STOP OR CONTROL WORRYING: NOT AT ALL
IF YOU CHECKED OFF ANY PROBLEMS ON THIS QUESTIONNAIRE, HOW DIFFICULT HAVE THESE PROBLEMS MADE IT FOR YOU TO DO YOUR WORK, TAKE CARE OF THINGS AT HOME, OR GET ALONG WITH OTHER PEOPLE: NOT DIFFICULT AT ALL
7. FEELING AFRAID AS IF SOMETHING AWFUL MIGHT HAPPEN: NOT AT ALL
4. TROUBLE RELAXING: NOT AT ALL
1. FEELING NERVOUS, ANXIOUS, OR ON EDGE: NOT AT ALL
GAD7 TOTAL SCORE: 0
5. BEING SO RESTLESS THAT IT IS HARD TO SIT STILL: NOT AT ALL
3. WORRYING TOO MUCH ABOUT DIFFERENT THINGS: NOT AT ALL

## 2024-03-22 ASSESSMENT — PATIENT HEALTH QUESTIONNAIRE - PHQ9
1. LITTLE INTEREST OR PLEASURE IN DOING THINGS: NOT AT ALL
SUM OF ALL RESPONSES TO PHQ9 QUESTIONS 1 AND 2: 0
2. FEELING DOWN, DEPRESSED OR HOPELESS: NOT AT ALL
2. FEELING DOWN, DEPRESSED OR HOPELESS: NOT AT ALL
1. LITTLE INTEREST OR PLEASURE IN DOING THINGS: NOT AT ALL
SUM OF ALL RESPONSES TO PHQ9 QUESTIONS 1 AND 2: 0

## 2024-03-22 ASSESSMENT — ENCOUNTER SYMPTOMS
DEPRESSION: 0
LOSS OF SENSATION IN FEET: 0
WEAKNESS: 1
OCCASIONAL FEELINGS OF UNSTEADINESS: 0
NUMBNESS: 1

## 2024-03-22 ASSESSMENT — ACTIVITIES OF DAILY LIVING (ADL)
TAKING_MEDICATION: INDEPENDENT
MANAGING_FINANCES: INDEPENDENT
DRESSING: INDEPENDENT
DOING_HOUSEWORK: INDEPENDENT
BATHING: INDEPENDENT
GROCERY_SHOPPING: INDEPENDENT

## 2024-03-22 NOTE — ASSESSMENT & PLAN NOTE
Up-to-date with vaccinations and screenings had colonoscopy completed May 25, 2021 with colon polyp okay for 5 years surveillance discussed advanced medical directives completed

## 2024-03-22 NOTE — PROGRESS NOTES
"Subjective   Reason for Visit: Jonathan Watts is an 74 y.o. male here for a Medicare Wellness visit.          Reviewed all medications by prescribing practitioner or clinical pharmacist (such as prescriptions, OTCs, herbal therapies and supplements) and documented in the medical record.    HPI    Patient Care Team:  Luis Banegas DO as PCP - General  Luis Banegas DO as PCP - [employer] PCP     Review of Systems    Objective   Vitals:  /60 (BP Location: Left arm, Patient Position: Sitting)   Pulse 68   Ht 1.651 m (5' 5\")   Wt 80.3 kg (177 lb)   BMI 29.45 kg/m²       Physical Exam    Assessment/Plan   Problem List Items Addressed This Visit       Dyslipidemia, goal LDL below 100    Essential hypertension    Gastroesophageal reflux disease without esophagitis    Type 2 diabetes mellitus with hyperglycemia, without long-term current use of insulin (CMS/[facility])    Adult body mass index 28.0-28.9     Other Visit Diagnoses       Flu vaccine need                   "

## 2024-03-22 NOTE — PROGRESS NOTES
Depression and anxiety screening completed   PHQ9 score   GAD7 score   I spent 15 minutes obtaining and discussing depression screening using PHQ 2 questions with results documented in chart.  Screening using PHQ-9 and DESIRE-7 scores were used for follow-up with treatment and referral plan discussed.      I spent 15 minutes face-to-face with this individual discussing the cardiovascular risk and behavioral therapies of nutritional choices exercise and elimination of habits contributing to risk .We agreed on a plan how they may be able to reduce  current cardiovascular risk.  Per patient with calculation greater than 10% aspirin use was discussed and encouraged unless known allergy or increased risk of bleeding contraindicates use patient's 10-year CV risk estimate calculates:I spent greater than 15 minutes discussing advance care planning including the explanation and discussion of advanced directives.  If patient does not have current up-to-date documents examples and information provided on how to create both living will and power of . [facility] toolkit was given to patient and was encouraged to work out completing these documents.Subjective   Reason for Visit: Jonathan Watts is an 74 y.o. male here for a Medicare Wellness visit.     Past Medical, Surgical, and Family History reviewed and updated in chart.    Reviewed all medications by prescribing practitioner or clinical pharmacist (such as prescriptions, OTCs, herbal therapies and supplements) and documented in the medical record.    Neuropathy increasing in wrists and hands difficult to make a fist tried exercises with fingers for arthritis but made worse symptoms overnight exacerbated right greater than left wrist most consistent with carpal tunnel Tinel's and Phalen's test positive        Patient Care Team:  Luis Banegas DO as PCP - General  Luis Banegas DO as PCP - [employer] PCP     Review of Systems   Neurological:  Positive for  "weakness and numbness.   All other systems reviewed and are negative.      Objective   Vitals:  /60 (BP Location: Left arm, Patient Position: Sitting)   Pulse 68   Ht 1.651 m (5' 5\")   Wt 80.3 kg (177 lb)   BMI 29.45 kg/m²       Physical Exam  Vitals and nursing note reviewed.   Constitutional:       General: He is not in acute distress.     Appearance: Normal appearance. He is well-developed. He is not toxic-appearing.   HENT:      Head: Normocephalic and atraumatic.      Right Ear: Tympanic membrane and external ear normal.      Left Ear: Tympanic membrane and external ear normal.      Nose: Nose normal.      Mouth/Throat:      Mouth: Mucous membranes are moist.      Pharynx: Oropharynx is clear. No oropharyngeal exudate or posterior oropharyngeal erythema.      Tonsils: No tonsillar exudate. 2+ on the right. 2+ on the left.   Eyes:      Extraocular Movements: Extraocular movements intact.      Conjunctiva/sclera: Conjunctivae normal.   Cardiovascular:      Rate and Rhythm: Normal rate and regular rhythm.      Pulses: Normal pulses.      Heart sounds: Normal heart sounds. No murmur heard.  Pulmonary:      Effort: Pulmonary effort is normal.      Breath sounds: Normal breath sounds.   Abdominal:      General: Abdomen is flat. Bowel sounds are normal.      Palpations: Abdomen is soft.   Musculoskeletal:      Cervical back: Neck supple.   Feet:      Right foot:      Skin integrity: Skin integrity normal. No ulcer, blister, skin breakdown, erythema, warmth or callus.      Toenail Condition: Right toenails are normal.      Left foot:      Skin integrity: Skin integrity normal. No ulcer, blister, skin breakdown, erythema, warmth or callus.      Toenail Condition: Left toenails are normal.   Lymphadenopathy:      Cervical: No cervical adenopathy.   Skin:     General: Skin is warm and dry.      Capillary Refill: Capillary refill takes more than 3 seconds.      Findings: No rash.   Neurological:      Mental " Status: He is alert. Mental status is at baseline.      Sensory: Sensation is intact.   Psychiatric:         Mood and Affect: Mood normal.         Behavior: Behavior normal.         Thought Content: Thought content normal.         Judgment: Judgment normal.         Assessment/Plan   Problem List Items Addressed This Visit       Anxiety, mild    Current Assessment & Plan     Stable doing well on low-dose sertraline         Dyslipidemia associated with type 2 diabetes mellitus (CMS/[facility])    Current Assessment & Plan     LDL cholesterol optimal at 56 with HDL of 36 triglyceride level 142 continue atorvastatin 10 mg daily         Relevant Orders    EMG & nerve conduction    Referral to Orthopaedic Surgery    Follow Up In [facility] - PCP - Established    Comprehensive Metabolic Panel    Hemoglobin A1C    Lipid Panel    Albumin , Urine Random    Essential hypertension    Current Assessment & Plan     Blood pressure stable continueLosartan 100 mg daily with chlorthalidone 25 mg daily creatinine of 1.1 with GFR 68 no microalbuminuria         Relevant Orders    EMG & nerve conduction    Referral to Orthopaedic Surgery    Follow Up In [facility] - PCP - Established    Comprehensive Metabolic Panel    Hemoglobin A1C    Lipid Panel    Albumin , Urine Random    Gastroesophageal reflux disease without esophagitis    Current Assessment & Plan     Stable no dysphagia dyspepsia weight loss or abdominal pain on omeprazole 20 mg daily         Type 2 diabetes mellitus with hyperglycemia, without long-term current use of insulin (CMS/[facility])    Current Assessment & Plan     Hemoglobin A1c mildly increased from 6.3-6.9 no evidence of microgram anuria continue metformin 500 mg twice a day consider adding medication such as SGLT2 inhibitor therapy versus GLP-1 agonist therapy if no improvement in 6 months patient will try diet lifestyle changes to improve status         Relevant Orders    EMG & nerve conduction     Referral to Orthopaedic Surgery    Follow Up In [facility] - PCP - Established    Comprehensive Metabolic Panel    Hemoglobin A1C    Lipid Panel    Albumin , Urine Random    BMI 29.0-29.9,adult    Medicare annual wellness visit, subsequent - Primary    Current Assessment & Plan     Up-to-date with vaccinations and screenings had colonoscopy completed May 25, 2021 with colon polyp okay for 5 years surveillance discussed advanced medical directives completed          Bilateral carpal tunnel syndrome    Current Assessment & Plan     Referral for EMG nerve conduction studies to be used on upper extremities with orthopedic hand consultation for decompression right greater than left wrist         Relevant Orders    EMG & nerve conduction    Referral to Orthopaedic Surgery    Follow Up In [facility] - PCP - Established    Comprehensive Metabolic Panel    Hemoglobin A1C    Lipid Panel    Albumin , Urine Random

## 2024-03-22 NOTE — ASSESSMENT & PLAN NOTE
Blood pressure stable continueLosartan 100 mg daily with chlorthalidone 25 mg daily creatinine of 1.1 with GFR 68 no microalbuminuria

## 2024-03-22 NOTE — ASSESSMENT & PLAN NOTE
Hemoglobin A1c mildly increased from 6.3-6.9 no evidence of microgram anuria continue metformin 500 mg twice a day consider adding medication such as SGLT2 inhibitor therapy versus GLP-1 agonist therapy if no improvement in 6 months patient will try diet lifestyle changes to improve status

## 2024-03-22 NOTE — ASSESSMENT & PLAN NOTE
Referral for EMG nerve conduction studies to be used on upper extremities with orthopedic hand consultation for decompression right greater than left wrist

## 2024-03-22 NOTE — ASSESSMENT & PLAN NOTE
LDL cholesterol optimal at 56 with HDL of 36 triglyceride level 142 continue atorvastatin 10 mg daily

## 2024-04-15 ENCOUNTER — OFFICE VISIT (OUTPATIENT)
Dept: ORTHOPEDIC SURGERY | Facility: CLINIC | Age: 74
End: 2024-04-15
Payer: MEDICARE

## 2024-04-15 DIAGNOSIS — E11.69 DYSLIPIDEMIA ASSOCIATED WITH TYPE 2 DIABETES MELLITUS (MULTI): ICD-10-CM

## 2024-04-15 DIAGNOSIS — I10 ESSENTIAL HYPERTENSION: ICD-10-CM

## 2024-04-15 DIAGNOSIS — G56.03 BILATERAL CARPAL TUNNEL SYNDROME: ICD-10-CM

## 2024-04-15 DIAGNOSIS — M65.352 TRIGGER FINGER, LEFT LITTLE FINGER: ICD-10-CM

## 2024-04-15 DIAGNOSIS — M65.342 TRIGGER FINGER, LEFT RING FINGER: ICD-10-CM

## 2024-04-15 DIAGNOSIS — M65.312 TRIGGER THUMB OF LEFT HAND: Primary | ICD-10-CM

## 2024-04-15 DIAGNOSIS — M65.322 TRIGGER FINGER, LEFT INDEX FINGER: ICD-10-CM

## 2024-04-15 DIAGNOSIS — E78.5 DYSLIPIDEMIA ASSOCIATED WITH TYPE 2 DIABETES MELLITUS (MULTI): ICD-10-CM

## 2024-04-15 DIAGNOSIS — M65.332 TRIGGER FINGER, LEFT MIDDLE FINGER: ICD-10-CM

## 2024-04-15 DIAGNOSIS — E11.65 TYPE 2 DIABETES MELLITUS WITH HYPERGLYCEMIA, WITHOUT LONG-TERM CURRENT USE OF INSULIN (MULTI): ICD-10-CM

## 2024-04-15 PROBLEM — K21.9 GASTROESOPHAGEAL REFLUX DISEASE WITHOUT ESOPHAGITIS: Status: RESOLVED | Noted: 2023-02-09 | Resolved: 2024-04-15

## 2024-04-15 PROCEDURE — 20550 NJX 1 TENDON SHEATH/LIGAMENT: CPT | Performed by: ORTHOPAEDIC SURGERY

## 2024-04-15 PROCEDURE — 1036F TOBACCO NON-USER: CPT | Performed by: ORTHOPAEDIC SURGERY

## 2024-04-15 PROCEDURE — 99204 OFFICE O/P NEW MOD 45 MIN: CPT | Performed by: ORTHOPAEDIC SURGERY

## 2024-04-15 PROCEDURE — 1159F MED LIST DOCD IN RCRD: CPT | Performed by: ORTHOPAEDIC SURGERY

## 2024-04-15 PROCEDURE — 1160F RVW MEDS BY RX/DR IN RCRD: CPT | Performed by: ORTHOPAEDIC SURGERY

## 2024-04-15 RX ORDER — LIDOCAINE HYDROCHLORIDE 10 MG/ML
1 INJECTION INFILTRATION; PERINEURAL
Status: COMPLETED | OUTPATIENT
Start: 2024-04-15 | End: 2024-04-15

## 2024-04-15 RX ADMIN — LIDOCAINE HYDROCHLORIDE 1 ML: 10 INJECTION INFILTRATION; PERINEURAL at 11:21

## 2024-04-15 RX ADMIN — LIDOCAINE HYDROCHLORIDE 1 ML: 10 INJECTION INFILTRATION; PERINEURAL at 11:20

## 2024-04-15 NOTE — PROGRESS NOTES
New patient Bilateral carpal tunnel syndrome right worse than the left, no numbness, but severe swelling in fingers and cannot make a fist , patient is also having weakness in legs. no prior testing or treatment, patient does have an EMG scheduled for 8/15/2024  Patient just wanted to come in and see if he can do conservative treatment till his testing

## 2024-04-15 NOTE — PROGRESS NOTES
74 y.o. male presents today for evaluation of bilateral hand swelling, stiffness, difficulty bending all digits. The patient reports symptoms for the past couple months, getting worse Pain is controlled. Patient reports no numbness and tingling.  Reports no previous surgeries, injections, or trauma to the area.  Reports pain worse with use, better at rest.   Pain dull ache.  Left hand worse than right.  Type II diabetic    Review of Systems    Constitutional: see HPI, no fever, no chills, not feeling tired, no significant weight gain or weight loss.   Eyes: No vision changes  ENT: no nosebleeds.   Cardiovascular: no chest pain.   Respiratory: no shortness of breath and no cough.   Gastrointestinal: no abdominal pain, no nausea, no vomiting and no diarrhea.   Musculoskeletal: per HPI  Neurological: no headache, no gait disturbances  Psychiatric: no depression and no sleep disturbances.   Endocrine: no muscle weakness and no muscle cramps.   Hematologic/Lymphatic: no swollen glands and no tendency for easy bruising or excessive swelling.     Patient's past medical history, past surgical history, allergies, and medications have been reviewed unless otherwise noted in the chart.     Trigger Exam  Digit: Bilateral hands all digits inspection:  no evidence of infection, no erythema, no edema, no ecchymosis, Palpation:  compartments are soft, TTP A1 pulley Range of Motion:  full composite finger flexion, Stability:  no finger instability, Strength:  5/5 strength with flexion and extension, Skin:  intact, Vascular:  capillary refill <2 seconds distally, Sensation:  sensation intact to light touch distally, Other:  no pain with palpation or motion proximally in the wrist.      Constitutional   General appearance: Alert and in no acute distress. Well developed, well nourished.    Eyes   External Eye, Conjunctiva and lids: Normal external exam - pupils were equal in size, round, reactive to light (PERRL) with normal  accommodation and extraocular movements intact (EOMI).   Ears, Nose, Mouth, and Throat   Hearing: Normal.   Neck   Neck: No neck mass was observed. Supple.   Pulmonary   Respiratory effort: No respiratory distress.   Cardiovascular   Examination of extremities: No peripheral edema.   Psychiatric   Judgment and insight: Intact.   Orientation to person, place, and time: Alert and oriented x 3.       Mood and affect: Normal.      Bilateral hand all digit triggers  Based on the history, physical exam and imaging studies above, the patient's presentation is consistent with the above diagnosis.  I had a long discussion with the patient regarding their presentation and the treatment options.  We discussed initial nonoperative versus operative management options as well as potential further diagnostic imaging.  We again discussed her treatment options going forward along with their associated risks and benefits. After thorough discussion, the patient has elected to proceed with conservative management. All questions were answered to the patients satisfaction who seems satisfied with the plan.  They will call the office with any questions/concerns.    Discussion I discussed the diagnosis and treatment options with the patient today along with their associated risks and benefits. After thorough discussion, the patient has elected to proceed with a corticosteroid injection with Kenalog and Xylocaine, which was performed in the office today under aseptic technique and the patient tolerated the procedure well.          Ortho Injections:           Injection site left hand all digits. Medication 1 cc 1% Xylocaine, 1 cc 10 mg Kenalog, Injection given under sterile conditions. No immediate complications noted. Post injection instructions given.  Follow-up 2 weeks to evaluate treatment and possibly injections in right    Patient ID: Jonathan Watts is a 74 y.o. male.    Hand / UE Inj/Asp: L small A1 for trigger finger on 4/15/2024 11:20  AM  Indications: therapeutic  Details: 25 G needle, volar approach  Medications: 1 mL lidocaine 10 mg/mL (1 %); 10 mg triamcinolone acetonide 10 mg/mL  Outcome: tolerated well, no immediate complications  Procedure, treatment alternatives, risks and benefits explained, specific risks discussed. Consent was given by the patient. Immediately prior to procedure a time out was called to verify the correct patient, procedure, equipment, support staff and site/side marked as required. Patient was prepped and draped in the usual sterile fashion.       Hand / UE Inj/Asp: L ring A1 for trigger finger on 4/15/2024 11:20 AM  Indications: therapeutic  Details: 25 G needle, volar approach  Medications: 1 mL lidocaine 10 mg/mL (1 %); 10 mg triamcinolone acetonide 10 mg/mL  Outcome: tolerated well, no immediate complications  Procedure, treatment alternatives, risks and benefits explained, specific risks discussed. Consent was given by the patient. Immediately prior to procedure a time out was called to verify the correct patient, procedure, equipment, support staff and site/side marked as required. Patient was prepped and draped in the usual sterile fashion.       Hand / UE Inj/Asp: L long A1 for trigger finger on 4/15/2024 11:21 AM  Indications: therapeutic  Details: 25 G needle, volar approach  Medications: 1 mL lidocaine 10 mg/mL (1 %); 10 mg triamcinolone acetonide 10 mg/mL  Outcome: tolerated well, no immediate complications  Procedure, treatment alternatives, risks and benefits explained, specific risks discussed. Consent was given by the patient. Immediately prior to procedure a time out was called to verify the correct patient, procedure, equipment, support staff and site/side marked as required. Patient was prepped and draped in the usual sterile fashion.       Hand / UE Inj/Asp: L index A1 for trigger finger on 4/15/2024 11:21 AM  Indications: therapeutic  Details: 25 G needle, volar approach  Medications: 1 mL  lidocaine 10 mg/mL (1 %); 10 mg triamcinolone acetonide 10 mg/mL  Outcome: tolerated well, no immediate complications  Procedure, treatment alternatives, risks and benefits explained, specific risks discussed. Consent was given by the patient. Immediately prior to procedure a time out was called to verify the correct patient, procedure, equipment, support staff and site/side marked as required. Patient was prepped and draped in the usual sterile fashion.       Hand / UE Inj/Asp: L thumb A1 for trigger finger on 4/15/2024 11:21 AM  Indications: therapeutic  Details: 25 G needle, volar approach  Medications: 1 mL lidocaine 10 mg/mL (1 %); 10 mg triamcinolone acetonide 10 mg/mL  Outcome: tolerated well, no immediate complications  Procedure, treatment alternatives, risks and benefits explained, specific risks discussed. Consent was given by the patient. Immediately prior to procedure a time out was called to verify the correct patient, procedure, equipment, support staff and site/side marked as required. Patient was prepped and draped in the usual sterile fashion.

## 2024-04-29 ENCOUNTER — OFFICE VISIT (OUTPATIENT)
Dept: ORTHOPEDIC SURGERY | Facility: CLINIC | Age: 74
End: 2024-04-29
Payer: MEDICARE

## 2024-04-29 DIAGNOSIS — M65.321 TRIGGER FINGER, RIGHT INDEX FINGER: ICD-10-CM

## 2024-04-29 DIAGNOSIS — M65.341 TRIGGER FINGER, RIGHT RING FINGER: ICD-10-CM

## 2024-04-29 DIAGNOSIS — M65.331 TRIGGER FINGER, RIGHT MIDDLE FINGER: ICD-10-CM

## 2024-04-29 DIAGNOSIS — M65.351 TRIGGER FINGER, RIGHT LITTLE FINGER: ICD-10-CM

## 2024-04-29 DIAGNOSIS — M65.311 TRIGGER FINGER OF RIGHT THUMB: Primary | ICD-10-CM

## 2024-04-29 PROCEDURE — 1160F RVW MEDS BY RX/DR IN RCRD: CPT | Performed by: ORTHOPAEDIC SURGERY

## 2024-04-29 PROCEDURE — 20550 NJX 1 TENDON SHEATH/LIGAMENT: CPT | Performed by: ORTHOPAEDIC SURGERY

## 2024-04-29 PROCEDURE — 99214 OFFICE O/P EST MOD 30 MIN: CPT | Performed by: ORTHOPAEDIC SURGERY

## 2024-04-29 PROCEDURE — 1159F MED LIST DOCD IN RCRD: CPT | Performed by: ORTHOPAEDIC SURGERY

## 2024-04-29 RX ORDER — LIDOCAINE HYDROCHLORIDE 10 MG/ML
1 INJECTION INFILTRATION; PERINEURAL
Status: COMPLETED | OUTPATIENT
Start: 2024-04-29 | End: 2024-04-29

## 2024-04-29 RX ORDER — TRIAMCINOLONE ACETONIDE 40 MG/ML
20 INJECTION, SUSPENSION INTRA-ARTICULAR; INTRAMUSCULAR
Status: COMPLETED | OUTPATIENT
Start: 2024-04-29 | End: 2024-04-29

## 2024-04-29 RX ADMIN — TRIAMCINOLONE ACETONIDE 20 MG: 40 INJECTION, SUSPENSION INTRA-ARTICULAR; INTRAMUSCULAR at 10:15

## 2024-04-29 RX ADMIN — LIDOCAINE HYDROCHLORIDE 1 ML: 10 INJECTION INFILTRATION; PERINEURAL at 10:15

## 2024-04-29 RX ADMIN — LIDOCAINE HYDROCHLORIDE 1 ML: 10 INJECTION INFILTRATION; PERINEURAL at 10:16

## 2024-04-29 RX ADMIN — LIDOCAINE HYDROCHLORIDE 1 ML: 10 INJECTION INFILTRATION; PERINEURAL at 10:17

## 2024-04-29 NOTE — PROGRESS NOTES
Patient seen 4/15/2024 for left hand all digit trigger injections. Patient here today for right hand all digit trigger injections.   No recent injury

## 2024-04-29 NOTE — PROGRESS NOTES
74 y.o. male presents today for follow up right hand swelling, stiffness, difficulty bending all digits. The patient reports symptoms for the past couple months, getting worse Pain is controlled. Patient reports no numbness and tingling.  Reports no previous surgeries, injections, or trauma to the area.  Reports pain worse with use, better at rest.   Pain dull ache.  Left hand worse than right.  Type II diabetic    Review of Systems    Constitutional: see HPI, no fever, no chills, not feeling tired, no significant weight gain or weight loss.   Eyes: No vision changes  ENT: no nosebleeds.   Cardiovascular: no chest pain.   Respiratory: no shortness of breath and no cough.   Gastrointestinal: no abdominal pain, no nausea, no vomiting and no diarrhea.   Musculoskeletal: per HPI  Neurological: no headache, no gait disturbances  Psychiatric: no depression and no sleep disturbances.   Endocrine: no muscle weakness and no muscle cramps.   Hematologic/Lymphatic: no swollen glands and no tendency for easy bruising or excessive swelling.     Patient's past medical history, past surgical history, allergies, and medications have been reviewed unless otherwise noted in the chart.     Trigger Exam  Digit: Right hands all digits inspection:  no evidence of infection, no erythema, no edema, no ecchymosis, Palpation:  compartments are soft, TTP A1 pulley Range of Motion:  full composite finger flexion, Stability:  no finger instability, Strength:  5/5 strength with flexion and extension, Skin:  intact, Vascular:  capillary refill <2 seconds distally, Sensation:  sensation intact to light touch distally, Other:  no pain with palpation or motion proximally in the wrist.      Constitutional   General appearance: Alert and in no acute distress. Well developed, well nourished.    Eyes   External Eye, Conjunctiva and lids: Normal external exam - pupils were equal in size, round, reactive to light (PERRL) with normal accommodation and  extraocular movements intact (EOMI).   Ears, Nose, Mouth, and Throat   Hearing: Normal.   Neck   Neck: No neck mass was observed. Supple.   Pulmonary   Respiratory effort: No respiratory distress.   Cardiovascular   Examination of extremities: No peripheral edema.   Psychiatric   Judgment and insight: Intact.   Orientation to person, place, and time: Alert and oriented x 3.       Mood and affect: Normal.      Right hand all digit triggers  Based on the history, physical exam and imaging studies above, the patient's presentation is consistent with the above diagnosis.  I had a long discussion with the patient regarding their presentation and the treatment options.  We discussed initial nonoperative versus operative management options as well as potential further diagnostic imaging.  We again discussed her treatment options going forward along with their associated risks and benefits. After thorough discussion, the patient has elected to proceed with conservative management. All questions were answered to the patients satisfaction who seems satisfied with the plan.  They will call the office with any questions/concerns.    Discussion I discussed the diagnosis and treatment options with the patient today along with their associated risks and benefits. After thorough discussion, the patient has elected to proceed with a corticosteroid injection with Kenalog and Xylocaine, which was performed in the office today under aseptic technique and the patient tolerated the procedure well.          Ortho Injections:           Injection site right hand all digits. Medication 1 cc 1% Xylocaine, 1 cc 10 mg Kenalog, Injection given under sterile conditions. No immediate complications noted. Post injection instructions given.  Follow-up 8 weeks prn     Patient ID: Jonathan Watts is a 74 y.o. male.    Hand / UE Inj/Asp: R thumb A1 for trigger finger on 4/29/2024 10:15 AM  Indications: therapeutic  Details: 25 G needle, volar  approach  Medications: 1 mL lidocaine 10 mg/mL (1 %); 20 mg triamcinolone acetonide 40 mg/mL; 10 mg triamcinolone acetonide 10 mg/mL  Outcome: tolerated well, no immediate complications  Procedure, treatment alternatives, risks and benefits explained, specific risks discussed. Consent was given by the patient. Immediately prior to procedure a time out was called to verify the correct patient, procedure, equipment, support staff and site/side marked as required. Patient was prepped and draped in the usual sterile fashion.       Hand / UE Inj/Asp: R index A1 for trigger finger on 4/29/2024 10:16 AM  Indications: therapeutic  Details: 25 G needle, volar approach  Medications: 1 mL lidocaine 10 mg/mL (1 %); 10 mg triamcinolone acetonide 10 mg/mL  Outcome: tolerated well, no immediate complications  Procedure, treatment alternatives, risks and benefits explained, specific risks discussed. Consent was given by the patient. Immediately prior to procedure a time out was called to verify the correct patient, procedure, equipment, support staff and site/side marked as required. Patient was prepped and draped in the usual sterile fashion.       Hand / UE Inj/Asp: R long A1 for trigger finger on 4/29/2024 10:16 AM  Indications: therapeutic  Details: 25 G needle, volar approach  Medications: 1 mL lidocaine 10 mg/mL (1 %); 10 mg triamcinolone acetonide 10 mg/mL  Outcome: tolerated well, no immediate complications  Procedure, treatment alternatives, risks and benefits explained, specific risks discussed. Consent was given by the patient. Immediately prior to procedure a time out was called to verify the correct patient, procedure, equipment, support staff and site/side marked as required. Patient was prepped and draped in the usual sterile fashion.       Hand / UE Inj/Asp: R ring A1 for trigger finger on 4/29/2024 10:16 AM  Indications: therapeutic  Details: 25 G needle, volar approach  Medications: 1 mL lidocaine 10 mg/mL (1 %);  10 mg triamcinolone acetonide 10 mg/mL  Outcome: tolerated well, no immediate complications  Procedure, treatment alternatives, risks and benefits explained, specific risks discussed. Consent was given by the patient. Immediately prior to procedure a time out was called to verify the correct patient, procedure, equipment, support staff and site/side marked as required. Patient was prepped and draped in the usual sterile fashion.       Hand / UE Inj/Asp: R small A1 for trigger finger on 4/29/2024 10:17 AM  Indications: therapeutic  Details: 25 G needle, volar approach  Medications: 1 mL lidocaine 10 mg/mL (1 %); 10 mg triamcinolone acetonide 10 mg/mL  Outcome: tolerated well, no immediate complications  Procedure, treatment alternatives, risks and benefits explained, specific risks discussed. Consent was given by the patient. Immediately prior to procedure a time out was called to verify the correct patient, procedure, equipment, support staff and site/side marked as required. Patient was prepped and draped in the usual sterile fashion.

## 2024-07-04 DIAGNOSIS — K21.9 GASTROESOPHAGEAL REFLUX DISEASE WITHOUT ESOPHAGITIS: Primary | ICD-10-CM

## 2024-07-05 RX ORDER — OMEPRAZOLE 20 MG/1
20 CAPSULE, DELAYED RELEASE ORAL DAILY
Qty: 90 CAPSULE | Refills: 3 | Status: SHIPPED | OUTPATIENT
Start: 2024-07-05

## 2024-08-12 DIAGNOSIS — E11.65 TYPE 2 DIABETES MELLITUS WITH HYPERGLYCEMIA, WITHOUT LONG-TERM CURRENT USE OF INSULIN (MULTI): Primary | ICD-10-CM

## 2024-08-12 RX ORDER — METFORMIN HYDROCHLORIDE 500 MG/1
500 TABLET ORAL
Qty: 180 TABLET | Refills: 3 | Status: SHIPPED | OUTPATIENT
Start: 2024-08-12

## 2024-08-15 ENCOUNTER — TELEPHONE (OUTPATIENT)
Dept: PRIMARY CARE | Facility: CLINIC | Age: 74
End: 2024-08-15

## 2024-08-15 ENCOUNTER — HOSPITAL ENCOUNTER (OUTPATIENT)
Dept: NEUROLOGY | Facility: HOSPITAL | Age: 74
Discharge: HOME | End: 2024-08-15
Payer: MEDICARE

## 2024-08-15 DIAGNOSIS — E11.69 DYSLIPIDEMIA ASSOCIATED WITH TYPE 2 DIABETES MELLITUS (MULTI): ICD-10-CM

## 2024-08-15 DIAGNOSIS — E11.65 TYPE 2 DIABETES MELLITUS WITH HYPERGLYCEMIA, WITHOUT LONG-TERM CURRENT USE OF INSULIN (MULTI): ICD-10-CM

## 2024-08-15 DIAGNOSIS — I10 ESSENTIAL HYPERTENSION: ICD-10-CM

## 2024-08-15 DIAGNOSIS — G56.03 BILATERAL CARPAL TUNNEL SYNDROME: ICD-10-CM

## 2024-08-15 DIAGNOSIS — E78.5 DYSLIPIDEMIA ASSOCIATED WITH TYPE 2 DIABETES MELLITUS (MULTI): ICD-10-CM

## 2024-08-15 PROCEDURE — 95885 MUSC TST DONE W/NERV TST LIM: CPT | Performed by: PSYCHIATRY & NEUROLOGY

## 2024-08-15 PROCEDURE — 95910 NRV CNDJ TEST 7-8 STUDIES: CPT | Performed by: PSYCHIATRY & NEUROLOGY

## 2024-08-15 PROCEDURE — 95886 MUSC TEST DONE W/N TEST COMP: CPT | Performed by: PSYCHIATRY & NEUROLOGY

## 2024-08-15 NOTE — TELEPHONE ENCOUNTER
----- Message from Luis Banegas sent at 8/15/2024  2:50 PM EDT -----  Nerve conduction studies consistent with mild right carpal tunnel syndrome the rest of the neurological exam is unremarkable

## 2024-09-20 DIAGNOSIS — E78.5 DYSLIPIDEMIA, GOAL LDL BELOW 100: ICD-10-CM

## 2024-09-20 DIAGNOSIS — I10 ESSENTIAL HYPERTENSION: ICD-10-CM

## 2024-09-20 DIAGNOSIS — Z23 FLU VACCINE NEED: ICD-10-CM

## 2024-09-20 RX ORDER — ATORVASTATIN CALCIUM 10 MG/1
10 TABLET, FILM COATED ORAL DAILY
Qty: 90 TABLET | Refills: 3 | Status: SHIPPED | OUTPATIENT
Start: 2024-09-20

## 2024-09-23 ENCOUNTER — LAB (OUTPATIENT)
Dept: LAB | Facility: LAB | Age: 74
End: 2024-09-23
Payer: MEDICARE

## 2024-09-23 DIAGNOSIS — E11.69 DYSLIPIDEMIA ASSOCIATED WITH TYPE 2 DIABETES MELLITUS (MULTI): ICD-10-CM

## 2024-09-23 DIAGNOSIS — E11.65 TYPE 2 DIABETES MELLITUS WITH HYPERGLYCEMIA, WITHOUT LONG-TERM CURRENT USE OF INSULIN: ICD-10-CM

## 2024-09-23 DIAGNOSIS — E78.5 DYSLIPIDEMIA ASSOCIATED WITH TYPE 2 DIABETES MELLITUS (MULTI): ICD-10-CM

## 2024-09-23 DIAGNOSIS — G56.03 BILATERAL CARPAL TUNNEL SYNDROME: ICD-10-CM

## 2024-09-23 DIAGNOSIS — I10 ESSENTIAL HYPERTENSION: ICD-10-CM

## 2024-09-23 LAB
ALBUMIN SERPL BCP-MCNC: 4.3 G/DL (ref 3.4–5)
ALP SERPL-CCNC: 92 U/L (ref 33–136)
ALT SERPL W P-5'-P-CCNC: 8 U/L (ref 10–52)
ANION GAP SERPL CALC-SCNC: 11 MMOL/L (ref 10–20)
AST SERPL W P-5'-P-CCNC: 16 U/L (ref 9–39)
BILIRUB SERPL-MCNC: 0.9 MG/DL (ref 0–1.2)
BUN SERPL-MCNC: 13 MG/DL (ref 6–23)
CALCIUM SERPL-MCNC: 8.8 MG/DL (ref 8.6–10.3)
CHLORIDE SERPL-SCNC: 103 MMOL/L (ref 98–107)
CHOLEST SERPL-MCNC: 121 MG/DL (ref 0–199)
CHOLESTEROL/HDL RATIO: 3.4
CO2 SERPL-SCNC: 28 MMOL/L (ref 21–32)
CREAT SERPL-MCNC: 1.08 MG/DL (ref 0.5–1.3)
EGFRCR SERPLBLD CKD-EPI 2021: 71 ML/MIN/1.73M*2
EST. AVERAGE GLUCOSE BLD GHB EST-MCNC: 137 MG/DL
GLUCOSE SERPL-MCNC: 115 MG/DL (ref 74–99)
HBA1C MFR BLD: 6.4 %
HDLC SERPL-MCNC: 35.8 MG/DL
LDLC SERPL CALC-MCNC: 64 MG/DL
NON HDL CHOLESTEROL: 85 MG/DL (ref 0–149)
POTASSIUM SERPL-SCNC: 4.1 MMOL/L (ref 3.5–5.3)
PROT SERPL-MCNC: 6.7 G/DL (ref 6.4–8.2)
SODIUM SERPL-SCNC: 138 MMOL/L (ref 136–145)
TRIGL SERPL-MCNC: 106 MG/DL (ref 0–149)
VLDL: 21 MG/DL (ref 0–40)

## 2024-09-23 PROCEDURE — 83036 HEMOGLOBIN GLYCOSYLATED A1C: CPT

## 2024-09-23 PROCEDURE — 80061 LIPID PANEL: CPT

## 2024-09-23 PROCEDURE — 80053 COMPREHEN METABOLIC PANEL: CPT

## 2024-09-23 PROCEDURE — 36415 COLL VENOUS BLD VENIPUNCTURE: CPT

## 2024-09-27 ENCOUNTER — APPOINTMENT (OUTPATIENT)
Dept: PRIMARY CARE | Facility: CLINIC | Age: 74
End: 2024-09-27
Payer: MEDICARE

## 2024-09-27 VITALS
SYSTOLIC BLOOD PRESSURE: 112 MMHG | BODY MASS INDEX: 28.99 KG/M2 | HEIGHT: 65 IN | HEART RATE: 64 BPM | DIASTOLIC BLOOD PRESSURE: 62 MMHG | WEIGHT: 174 LBS

## 2024-09-27 DIAGNOSIS — M54.16 CHRONIC RIGHT-SIDED LUMBAR RADICULOPATHY: ICD-10-CM

## 2024-09-27 DIAGNOSIS — K40.20 NON-RECURRENT BILATERAL INGUINAL HERNIA WITHOUT OBSTRUCTION OR GANGRENE: ICD-10-CM

## 2024-09-27 DIAGNOSIS — I10 ESSENTIAL HYPERTENSION: ICD-10-CM

## 2024-09-27 DIAGNOSIS — Z23 FLU VACCINE NEED: ICD-10-CM

## 2024-09-27 DIAGNOSIS — E11.69 DYSLIPIDEMIA ASSOCIATED WITH TYPE 2 DIABETES MELLITUS (MULTI): ICD-10-CM

## 2024-09-27 DIAGNOSIS — G56.03 BILATERAL CARPAL TUNNEL SYNDROME: ICD-10-CM

## 2024-09-27 DIAGNOSIS — E78.5 DYSLIPIDEMIA ASSOCIATED WITH TYPE 2 DIABETES MELLITUS (MULTI): ICD-10-CM

## 2024-09-27 DIAGNOSIS — E11.65 TYPE 2 DIABETES MELLITUS WITH HYPERGLYCEMIA, WITHOUT LONG-TERM CURRENT USE OF INSULIN: Primary | ICD-10-CM

## 2024-09-27 PROCEDURE — 3074F SYST BP LT 130 MM HG: CPT | Performed by: INTERNAL MEDICINE

## 2024-09-27 PROCEDURE — 1160F RVW MEDS BY RX/DR IN RCRD: CPT | Performed by: INTERNAL MEDICINE

## 2024-09-27 PROCEDURE — 90662 IIV NO PRSV INCREASED AG IM: CPT | Performed by: INTERNAL MEDICINE

## 2024-09-27 PROCEDURE — 99214 OFFICE O/P EST MOD 30 MIN: CPT | Performed by: INTERNAL MEDICINE

## 2024-09-27 PROCEDURE — G0008 ADMIN INFLUENZA VIRUS VAC: HCPCS | Performed by: INTERNAL MEDICINE

## 2024-09-27 PROCEDURE — 1123F ACP DISCUSS/DSCN MKR DOCD: CPT | Performed by: INTERNAL MEDICINE

## 2024-09-27 PROCEDURE — 1036F TOBACCO NON-USER: CPT | Performed by: INTERNAL MEDICINE

## 2024-09-27 PROCEDURE — 1159F MED LIST DOCD IN RCRD: CPT | Performed by: INTERNAL MEDICINE

## 2024-09-27 PROCEDURE — 1158F ADVNC CARE PLAN TLK DOCD: CPT | Performed by: INTERNAL MEDICINE

## 2024-09-27 PROCEDURE — 3078F DIAST BP <80 MM HG: CPT | Performed by: INTERNAL MEDICINE

## 2024-09-27 ASSESSMENT — ENCOUNTER SYMPTOMS
FREQUENCY: 0
FLATUS: 0
FEVER: 0
HEADACHES: 0
DYSURIA: 0
ABDOMINAL PAIN: 1
CONSTIPATION: 0
NAUSEA: 0
ANOREXIA: 0
DIARRHEA: 0
ARTHRALGIAS: 0
WEIGHT LOSS: 0
VOMITING: 0
BACK PAIN: 1
HEMATURIA: 0
HEMATOCHEZIA: 0
MYALGIAS: 0
BELCHING: 0

## 2024-09-27 NOTE — ASSESSMENT & PLAN NOTE
Updated influenza vaccine high-dose advocated for COVID-19 and RSV vaccination  Orders:    Flu vaccine, trivalent, preservative free, HIGH-DOSE, age 65y+ (Fluzone)

## 2024-09-27 NOTE — PROGRESS NOTES
Subjective   Reason for Visit: Jonathan Watts is an 74 y.o. male here for a fu  visit.     Past Medical, Surgical, and Family History reviewed and updated in chart.    Reviewed all medications by prescribing practitioner or clinical pharmacist (such as prescriptions, OTCs, herbal therapies and supplements) and documented in the medical record.    Back Pain  This is a chronic problem. The current episode started more than 1 year ago. The problem occurs daily. The problem has been waxing and waning since onset. The pain is present in the lumbar spine. The pain radiates to the right foot and right thigh. The pain is at a severity of 5/10. The pain is moderate. The pain is Worse during the day. The symptoms are aggravated by bending, position, sitting, standing and twisting. Stiffness is present All day. Associated symptoms include abdominal pain. Pertinent negatives include no dysuria, fever, headaches or weight loss. He has tried analgesics, heat, home exercises, bed rest, muscle relaxant, walking, NSAIDs and ice for the symptoms. The treatment provided mild relief.   Abdominal Pain  This is a new problem. The current episode started more than 1 month ago. The onset quality is gradual. The problem occurs intermittently. The most recent episode lasted 6 months. The problem has been waxing and waning. The pain is located in the suprapubic region. The pain is at a severity of 3/10. The pain is mild. The quality of the pain is dull and a sensation of fullness. The abdominal pain does not radiate. Pertinent negatives include no anorexia, arthralgias, belching, constipation, diarrhea, dysuria, fever, flatus, frequency, headaches, hematochezia, hematuria, melena, myalgias, nausea, vomiting or weight loss. The pain is aggravated by certain positions and movement. The pain is relieved by Being still, certain positions and recumbency. He has tried nothing for the symptoms. The treatment provided mild relief. Prior diagnostic  "workup includes surgery. His past medical history is significant for GERD.       Patient Care Team:  Luis Banegas DO as PCP - General  Luis Banegas DO as PCP - [employer] PCP     Review of Systems   Constitutional:  Negative for fever and weight loss.   Gastrointestinal:  Positive for abdominal pain. Negative for anorexia, constipation, diarrhea, flatus, hematochezia, melena, nausea and vomiting.   Genitourinary:  Negative for dysuria, frequency and hematuria.   Musculoskeletal:  Positive for back pain. Negative for arthralgias and myalgias.   Neurological:  Negative for headaches.   All other systems reviewed and are negative.      Objective   Vitals:  /62   Pulse 64   Ht 1.651 m (5' 5\")   Wt 78.9 kg (174 lb)   BMI 28.96 kg/m²       Physical Exam  Vitals and nursing note reviewed.   Constitutional:       General: He is not in acute distress.     Appearance: Normal appearance. He is well-developed. He is not toxic-appearing.   HENT:      Head: Normocephalic and atraumatic.      Right Ear: Tympanic membrane and external ear normal.      Left Ear: Tympanic membrane and external ear normal.      Nose: Nose normal.      Mouth/Throat:      Mouth: Mucous membranes are moist.      Pharynx: Oropharynx is clear. No oropharyngeal exudate or posterior oropharyngeal erythema.      Tonsils: No tonsillar exudate. 2+ on the right. 2+ on the left.   Eyes:      Extraocular Movements: Extraocular movements intact.      Conjunctiva/sclera: Conjunctivae normal.   Cardiovascular:      Rate and Rhythm: Normal rate and regular rhythm.      Pulses: Normal pulses.      Heart sounds: Normal heart sounds. No murmur heard.  Pulmonary:      Effort: Pulmonary effort is normal.      Breath sounds: Normal breath sounds.   Abdominal:      General: Abdomen is flat. Bowel sounds are normal.      Palpations: Abdomen is soft.      Hernia: A hernia is present.       Musculoskeletal:      Cervical back: Neck supple. "   Feet:      Right foot:      Skin integrity: Skin integrity normal. No ulcer, blister, skin breakdown, erythema, warmth or callus.      Toenail Condition: Right toenails are normal.      Left foot:      Skin integrity: Skin integrity normal. No ulcer, blister, skin breakdown, erythema, warmth or callus.      Toenail Condition: Left toenails are normal.   Lymphadenopathy:      Cervical: No cervical adenopathy.   Skin:     General: Skin is warm and dry.      Capillary Refill: Capillary refill takes more than 3 seconds.      Findings: No rash.   Neurological:      Mental Status: He is alert. Mental status is at baseline.      GCS: GCS eye subscore is 4. GCS verbal subscore is 5. GCS motor subscore is 6.      Cranial Nerves: Cranial nerves 2-12 are intact.      Sensory: Sensation is intact.      Motor: Tremor present. No weakness, atrophy or abnormal muscle tone.      Coordination: Coordination is intact.      Gait: Gait is intact.      Deep Tendon Reflexes: Reflexes are normal and symmetric.      Reflex Scores:       Tricep reflexes are 2+ on the right side and 2+ on the left side.  Psychiatric:         Mood and Affect: Mood normal.         Behavior: Behavior normal.         Thought Content: Thought content normal.         Judgment: Judgment normal.     Lifestyle Recommendations  I recommend a whole-food plant-based diet, an eating pattern that encourages the consumption of unrefined plant foods (such as fruits, vegetables, tubers, whole grains, legumes, nuts and seeds) and discourages meats, dairy products, eggs and processed foods.     The AHA/ACC recommends that the patient consume a dietary pattern that emphasizes intake of vegetables, fruits, and whole grains; includes low-fat dairy products, poultry, fish, legumes, non-tropical vegetable oils, and nuts; and limits intake of sodium, sweets, sugar-sweetened beverages, and red meats.  Adapt this dietary pattern to appropriate calorie requirements (a 500-750  kcal/day deficit to loose weight), personal and cultural food preferences, and nutrition therapy for other medical conditions (including diabetes).  Achieve this pattern by following plans such as the [place] Mediterranean, DASH dietary pattern, or AHA diet.     Engage in 2 hours and 30 minutes per week of moderate-intensity physical activity, or 1 hour and 15 minutes (75 minutes) per week of vigorous-intensity aerobic physical activity, or an equivalent combination of moderate and vigorous-intensity aerobic physical activity. Aerobic activity should be performed in episodes of at least 10 minutes preferably spread throughout the week.     Adhering to a heart healthy diet, regular exercise habits, avoidance of tobacco products, and maintenance of a healthy weight are crucial components of their heart disease risk reduction.    Assessment & Plan  Essential hypertension  Well-controlled on chlorthalidone 25 mg daily with losartan 100 mg daily  Orders:    Follow Up In [facility] - PCP - Established    Follow Up In [facility] - PCP - Medicare Annual; Future    Type 2 diabetes mellitus with hyperglycemia, without long-term current use of insulin  Hemoglobin A1c improved to 6.4 with fasting blood sugar improved to 115 continue metformin 500 mg twice a day with meals continue regular activity as tolerated  Orders:    Follow Up In [facility] - PCP - Established    Referral to Physical Therapy; Future    Referral to General Surgery; Future    Comprehensive Metabolic Panel; Future    Hemoglobin A1C; Future    Lipid Panel; Future    Albumin-Creatinine Ratio, Urine Random; Future    Bilateral carpal tunnel syndrome  Improvement with injections EMG nerve conduction studies reveal very mild carpal tunnel and radial tunnel with chronic cervical radiculopathy C8-T1 symptoms improved after injection and trigger finger injection with orthopedic surgery continue to follow  Orders:    Follow Up In  [facility] - PCP - Established    Dyslipidemia associated with type 2 diabetes mellitus (Multi)  LDL cholesterol optimal 64 with HDL 35 triglyceride level 106 on atorvastatin 10 mg daily  Orders:    Follow Up In [facility] - PCP - Established    Referral to Physical Therapy; Future    Referral to General Surgery; Future    Comprehensive Metabolic Panel; Future    Hemoglobin A1C; Future    Lipid Panel; Future    Albumin-Creatinine Ratio, Urine Random; Future    Flu vaccine need  Updated influenza vaccine high-dose advocated for COVID-19 and RSV vaccination  Orders:    Flu vaccine, trivalent, preservative free, HIGH-DOSE, age 65y+ (Fluzone)    Non-recurrent bilateral inguinal hernia without obstruction or gangrene  Referral to general surgery for reducible hernia inguinal area below bladder causing pressure symptoms and discomfort left greater than right  Orders:    Referral to General Surgery; Future    Follow Up In [facility] - PCP - Medicare Annual; Future    Chronic right-sided lumbar radiculopathy  No neurological deficits noted patient noting increased difficulty for over 30 years after radiculopathy involving right lower extremity from disc degeneration referral to physical therapy for strengthening and gait improvement reevaluate 6 months  Orders:    Referral to Physical Therapy; Future

## 2024-09-27 NOTE — ASSESSMENT & PLAN NOTE
Improvement with injections EMG nerve conduction studies reveal very mild carpal tunnel and radial tunnel with chronic cervical radiculopathy C8-T1 symptoms improved after injection and trigger finger injection with orthopedic surgery continue to follow  Orders:    Follow Up In [facility] - PCP - Established

## 2024-09-27 NOTE — ASSESSMENT & PLAN NOTE
Well-controlled on chlorthalidone 25 mg daily with losartan 100 mg daily  Orders:    Follow Up In [facility] - PCP - Established    Follow Up In [facility] - PCP - Medicare Annual; Future

## 2024-09-27 NOTE — ASSESSMENT & PLAN NOTE
Hemoglobin A1c improved to 6.4 with fasting blood sugar improved to 115 continue metformin 500 mg twice a day with meals continue regular activity as tolerated  Orders:    Follow Up In [facility] - PCP - Established    Referral to Physical Therapy; Future    Referral to General Surgery; Future    Comprehensive Metabolic Panel; Future    Hemoglobin A1C; Future    Lipid Panel; Future    Albumin-Creatinine Ratio, Urine Random; Future

## 2024-09-27 NOTE — ASSESSMENT & PLAN NOTE
LDL cholesterol optimal 64 with HDL 35 triglyceride level 106 on atorvastatin 10 mg daily  Orders:    Follow Up In [facility] - PCP - Established    Referral to Physical Therapy; Future    Referral to General Surgery; Future    Comprehensive Metabolic Panel; Future    Hemoglobin A1C; Future    Lipid Panel; Future    Albumin-Creatinine Ratio, Urine Random; Future

## 2024-09-27 NOTE — PROGRESS NOTES
"Subjective   Patient ID: Jonathan Watts is a 74 y.o. male who presents for Follow-up.    HPI     Review of Systems    Objective   /62   Pulse 64   Ht 1.651 m (5' 5\")   Wt 78.9 kg (174 lb)   BMI 28.96 kg/m²     Physical Exam    Assessment/Plan          "

## 2024-10-08 DIAGNOSIS — Z23 FLU VACCINE NEED: ICD-10-CM

## 2024-10-08 DIAGNOSIS — I10 ESSENTIAL HYPERTENSION: ICD-10-CM

## 2024-10-08 RX ORDER — LOSARTAN POTASSIUM 100 MG/1
100 TABLET ORAL DAILY
Qty: 90 TABLET | Refills: 3 | Status: SHIPPED | OUTPATIENT
Start: 2024-10-08

## 2024-10-08 RX ORDER — CHLORTHALIDONE 25 MG/1
25 TABLET ORAL DAILY
Qty: 90 TABLET | Refills: 3 | Status: SHIPPED | OUTPATIENT
Start: 2024-10-08

## 2024-10-10 ENCOUNTER — EVALUATION (OUTPATIENT)
Dept: PHYSICAL THERAPY | Facility: HOSPITAL | Age: 74
End: 2024-10-10
Payer: MEDICARE

## 2024-10-10 DIAGNOSIS — M54.16 CHRONIC RIGHT-SIDED LUMBAR RADICULOPATHY: ICD-10-CM

## 2024-10-10 DIAGNOSIS — R29.898 WEAKNESS OF RIGHT LOWER EXTREMITY: Primary | ICD-10-CM

## 2024-10-10 PROCEDURE — 97161 PT EVAL LOW COMPLEX 20 MIN: CPT | Mod: GP | Performed by: PHYSICAL THERAPIST

## 2024-10-10 PROCEDURE — 97110 THERAPEUTIC EXERCISES: CPT | Mod: GP | Performed by: PHYSICAL THERAPIST

## 2024-10-10 ASSESSMENT — ENCOUNTER SYMPTOMS
DEPRESSION: 0
OCCASIONAL FEELINGS OF UNSTEADINESS: 0
LOSS OF SENSATION IN FEET: 1

## 2024-10-10 ASSESSMENT — PAIN - FUNCTIONAL ASSESSMENT: PAIN_FUNCTIONAL_ASSESSMENT: 0-10

## 2024-10-10 ASSESSMENT — PATIENT HEALTH QUESTIONNAIRE - PHQ9
1. LITTLE INTEREST OR PLEASURE IN DOING THINGS: NOT AT ALL
2. FEELING DOWN, DEPRESSED OR HOPELESS: NOT AT ALL
SUM OF ALL RESPONSES TO PHQ9 QUESTIONS 1 AND 2: 0

## 2024-10-10 ASSESSMENT — PAIN DESCRIPTION - DESCRIPTORS: DESCRIPTORS: TIGHTNESS

## 2024-10-10 NOTE — PROGRESS NOTES
Physical Therapy    Physical Therapy Evaluation and Treatment      Patient Name: Jonathan Watts  MRN: [id number]  : 1950   Today's Date: 10/10/2024  Time Calculation  Start Time: 1100  Stop Time: 1150  Time Calculation (min): 50 min     PT Evaluation Time Entry  PT Evaluation (Low) Time Entry: 30  PT Therapeutic Procedures Time Entry  Therapeutic Exercise Time Entry: 20      Visit # 1    Assessment: Pt presents with posterior tightness through lumbar spine and LE's causing pain and affecting his gait and function. Pt would benefit from skilled PT to address these limitations for improved function and quality of life.    PT Assessment Results: Decreased strength, Decreased range of motion, Decreased mobility, Pain  Rehab Prognosis: Excellent    Plan:  Treatment/Interventions: Cryotherapy, Education/ Instruction, Hot pack, Manual therapy, Therapeutic exercises  PT Plan: Skilled PT  PT Frequency: 2 times per week  Duration: 4-6 wks  Certification Period Start Date: 24  Certification Period End Date: 25  Number of Treatments Authorized: Med nec  Rehab Potential: Excellent  Plan of Care Agreement: Patient    Current Problem:   1. Weakness of right lower extremity  Follow Up In Physical Therapy      2. Chronic right-sided lumbar radiculopathy  Referral to Physical Therapy    Follow Up In Physical Therapy          Subjective      Chief complaint: Pt c/o right posterior LE pain from hip to knee, difficulty walking. Started about 6 months ago. Pain has started just recently similarly in the left posterior thigh.  Denies LBP. Has some tingling in the tips of his toes. Difficulty getting up from the floor.     Pain Better: sit down    Pain Worse: standing, walking    Imaging: (From ) Moderate lumbar degenerative changes, greatest at the L4-5 level.     Prior level of function: independent     Current limitations: standing/walking    Home setup: Lives with wife in ranch    Work: retired    Patient's goal:  decrease pain, get up easier    Precautions:  Precautions  STEADI Fall Risk Score (The score of 4 or more indicates an increased risk of falling): 3  Precautions Comment: Cal inguinal hernia    Pain:  Pain Assessment  Pain Assessment: 0-10  Pain Type: Chronic pain  Pain Location: Leg  Pain Orientation: Right, Left  Pain Descriptors: Tightness    Objective:  Objective   Gait: antalgic right    Posture: decreased lumbar lordosis    Lumbar ROM  FLEX: -50% with hamstring tightness  EXT: -25%  SB R: -25%  SB L: -25%    LE strength:  R grossly 4+/5  L grossly 5/5    Hamstrings: 50 deg cal  Posterior tightness with SKTC and DKTC  Lateral tightness with knee rotations cal    Slump test: (-) cal  SLR: (-) cal  LE Pull: NC    Thigh thrust: (-) cal  Spring test: (+)    TTP: mild pain PA sacrum, cal sacral borders    Outcome Measures:  Other Measures  Oswestry Disablity Index (DAVIDSON): 20%     Treatments:  EXERCISES Date  Date  Date Date   VISIT# # # # #    REPS REPS REPS REPS   Nu-Step              Shuttle:        DLP       SLP              Parallel bars:       Mini squats                            Ball:       DKTC       ROT              Piriformis stretch              PPT       PPT with marching       Bridges       T-band hip ABD/ER                            HEP: Access Code: [email or web address]  Exercises  - Supine Single Knee to Chest  - 2 x daily - 3 sets - 30 seconds hold  - Supine Double Knee to Chest  - 2 x daily - 3 sets - 30 seconds hold  - Supine Lower Trunk Rotation  - 2 x daily - 3 reps - 30 hold  - Seated Hamstring Stretch  - 2 x daily - 3 sets - 30 seconds hold          Goals:  Active       Right lumbar radiculopathy       Trunk AROM WNL and pain-free for improved mobility.         Start:  10/10/24    Expected End:  01/08/25            Improve hamstring flexibility >=15 deg for improved mobility.       Start:  10/10/24    Expected End:  01/08/25            5/5 cal LE/trunk strength for improved getting up off the floor.         Start:  10/10/24    Expected End:  01/08/25            <=1/10 pain with daily activities for improved house/yard work.        Start:  10/10/24    Expected End:  01/08/25            Improve Modified Oswestry score >=20 points for improved mobility.        Start:  10/10/24    Expected End:  01/08/25

## 2024-10-16 ENCOUNTER — TREATMENT (OUTPATIENT)
Dept: PHYSICAL THERAPY | Facility: HOSPITAL | Age: 74
End: 2024-10-16
Payer: MEDICARE

## 2024-10-16 DIAGNOSIS — R29.898 WEAKNESS OF RIGHT LOWER EXTREMITY: ICD-10-CM

## 2024-10-16 DIAGNOSIS — M54.16 CHRONIC RIGHT-SIDED LUMBAR RADICULOPATHY: ICD-10-CM

## 2024-10-16 PROCEDURE — 97110 THERAPEUTIC EXERCISES: CPT | Mod: GP,CQ

## 2024-10-16 ASSESSMENT — PAIN SCALES - GENERAL: PAINLEVEL_OUTOF10: 5 - MODERATE PAIN

## 2024-10-16 ASSESSMENT — PAIN DESCRIPTION - DESCRIPTORS: DESCRIPTORS: TIGHTNESS

## 2024-10-16 ASSESSMENT — PAIN - FUNCTIONAL ASSESSMENT: PAIN_FUNCTIONAL_ASSESSMENT: 0-10

## 2024-10-16 NOTE — PROGRESS NOTES
Physical Therapy    Physical Therapy Treatment    Patient Name: Jonathan Watts  MRN: [id number]  : 1950  Today's Date: 10/16/2024  Time Calculation  Start Time: 1515  Stop Time: 1600  Time Calculation (min): 45 min    PT Therapeutic Procedures Time Entry  Therapeutic Exercise Time Entry: 45          VISIT:# 2    Current Problem  Problem List Items Addressed This Visit             ICD-10-CM       Musculoskeletal and Injuries    Weakness of right lower extremity R29.898       Neuro    Chronic right-sided lumbar radiculopathy M54.16        Subjective   Pt reports pain in R and L legs 5/10. He says the pain feels like tightness.     Precautions  Precautions  STEADI Fall Risk Score (The score of 4 or more indicates an increased risk of falling): 3  Precautions Comment: Cal inguinal hernia       Pain  Pain Assessment: 0-10  0-10 (Numeric) Pain Score: 5 - Moderate pain  Pain Type: Chronic pain  Pain Location: Leg  Pain Orientation: Right, Left  Pain Descriptors: Tightness       Objective            Treatments:  EXERCISES Date 10/16/2024  Date  Date Date   VISIT# #2 # # #    REPS REPS REPS REPS   Nu-Step L2 10 min             Shuttle:        DLP 4B 2x10      SLP 4B 2x10 Cal             Parallel bars:       Mini squats 2x10      Marches 2x10 Cal      HR 2x10             Ball:       DKTC 2x10      ROT 2x10             Piriformis stretch              PPT       PPT with marching       Bridges 2x10      T-band hip ABD/ER 1x10                           HEP: Access Code: [email or web address]  Exercises  - Supine Single Knee to Chest  - 2 x daily - 3 sets - 30 seconds hold  - Supine Double Knee to Chest  - 2 x daily - 3 sets - 30 seconds hold  - Supine Lower Trunk Rotation  - 2 x daily - 3 reps - 30 hold  - Seated Hamstring Stretch  - 2 x daily - 3 sets - 30 seconds hold Reviewed HEP with Pt          Assessment:  Pt reported pain of 5/10 at the end of session. Reviewed HEP program with pt at the end of the session.      Plan:  OP PT  Plan  Treatment/Interventions: Cryotherapy, Education/ Instruction, Hot pack, Manual therapy, Therapeutic exercises  PT Plan: Skilled PT  PT Frequency: 2 times per week  Duration: 4-6 wks  Certification Period Start Date: 09/27/24  Certification Period End Date: 09/27/25  Number of Treatments Authorized: Med nec  Rehab Potential: Excellent  Plan of Care Agreement: Patient    Goals:  Active       Right lumbar radiculopathy       Trunk AROM WNL and pain-free for improved mobility.         Start:  10/10/24    Expected End:  01/08/25            Improve hamstring flexibility >=15 deg for improved mobility.       Start:  10/10/24    Expected End:  01/08/25            5/5 pierre LE/trunk strength for improved getting up off the floor.        Start:  10/10/24    Expected End:  01/08/25            <=1/10 pain with daily activities for improved house/yard work.        Start:  10/10/24    Expected End:  01/08/25            Improve Modified Oswestry score >=20 points for improved mobility.        Start:  10/10/24    Expected End:  01/08/25

## 2024-10-18 ENCOUNTER — TREATMENT (OUTPATIENT)
Dept: PHYSICAL THERAPY | Facility: HOSPITAL | Age: 74
End: 2024-10-18
Payer: MEDICARE

## 2024-10-18 DIAGNOSIS — M54.16 CHRONIC RIGHT-SIDED LUMBAR RADICULOPATHY: ICD-10-CM

## 2024-10-18 DIAGNOSIS — R29.898 WEAKNESS OF RIGHT LOWER EXTREMITY: ICD-10-CM

## 2024-10-18 PROCEDURE — 97110 THERAPEUTIC EXERCISES: CPT | Mod: GP,CQ

## 2024-10-18 ASSESSMENT — PAIN DESCRIPTION - DESCRIPTORS: DESCRIPTORS: TIGHTNESS

## 2024-10-18 ASSESSMENT — PAIN SCALES - GENERAL: PAINLEVEL_OUTOF10: 4

## 2024-10-18 ASSESSMENT — PAIN - FUNCTIONAL ASSESSMENT: PAIN_FUNCTIONAL_ASSESSMENT: 0-10

## 2024-10-18 NOTE — PROGRESS NOTES
"Physical Therapy    Physical Therapy Treatment    Patient Name: Jonathan Watts  MRN: [id number]  : 1950  Today's Date: 10/18/2024  Time Calculation  Start Time: 1515  Stop Time: 1600  Time Calculation (min): 45 min    PT Therapeutic Procedures Time Entry  Therapeutic Exercise Time Entry: 45          VISIT:# 3    Current Problem  Problem List Items Addressed This Visit             ICD-10-CM       Musculoskeletal and Injuries    Weakness of right lower extremity R29.898       Neuro    Chronic right-sided lumbar radiculopathy M54.16        Subjective   Pt reported pain in both legs 4/10     Precautions  Precautions  STEADI Fall Risk Score (The score of 4 or more indicates an increased risk of falling): 3  Precautions Comment: Cal inguinal hernia       Pain  Pain Assessment: 0-10  0-10 (Numeric) Pain Score: 4  Pain Type: Chronic pain  Pain Location: Leg  Pain Orientation: Right, Left  Pain Descriptors: Tightness       Objective    Today we added lateral step ups, step ups, and piriformis stretch to list of treatments.        Treatments:  EXERCISES Date 10/16/2024  Date 10/18/2024  Date Date   VISIT# #2 #3 # #    REPS REPS REPS REPS   Nu-Step L2 10 min L2 10 min            Shuttle:        DLP 4B 2x10 4B 2x10     SLP 4B 2x10 Cal 4B 2x10            Parallel bars:       Mini squats 2x10 2x10     Marches 2x10 Cal 2x10 Cal     HR 2x10 2x10     Step ups  8in step x10 Cal     Lateral step ups  8in step Cal            Ball:       DKTC 2x10 Green ball 2x10     ROT 2x10 Green ball 2x10            Piriformis stretch  Supine; x2 30\" H            PPT       PPT with marching       Bridges 2x10 2x10     T-band hip ABD/ER 1x10                           HEP: Access Code: [email or web address]  Exercises  - Supine Single Knee to Chest  - 2 x daily - 3 sets - 30 seconds hold  - Supine Double Knee to Chest  - 2 x daily - 3 sets - 30 seconds hold  - Supine Lower Trunk Rotation  - 2 x daily - 3 reps - 30 hold  - Seated Hamstring Stretch  - 2 x daily - " 3 sets - 30 seconds hold Reviewed HEP with Pt Reviewed w/ Pt         Assessment:  Pt reported pain 4/10 in both legs at the end of session. Pt expressed a lot of interest in getting home exercise equipment such as the leg press so he can continue to workout at home. Educated pt on therabands and believe that he could benefit from a HEP involving them in future sessions. He described feeling loose and and not as stiff after session. Continue to focus on LE strengthening and pt education in future sessions.      Plan:  OP PT Plan  Treatment/Interventions: Cryotherapy, Education/ Instruction, Hot pack, Manual therapy, Therapeutic exercises  PT Plan: Skilled PT  PT Frequency: 2 times per week  Duration: 4-6 wks  Certification Period Start Date: 09/27/24  Certification Period End Date: 09/27/25  Number of Treatments Authorized: Med nec  Rehab Potential: Excellent  Plan of Care Agreement: Patient    Goals:  Active       Right lumbar radiculopathy       Trunk AROM WNL and pain-free for improved mobility.         Start:  10/10/24    Expected End:  01/08/25            Improve hamstring flexibility >=15 deg for improved mobility.       Start:  10/10/24    Expected End:  01/08/25            5/5 pierre LE/trunk strength for improved getting up off the floor.        Start:  10/10/24    Expected End:  01/08/25            <=1/10 pain with daily activities for improved house/yard work.        Start:  10/10/24    Expected End:  01/08/25            Improve Modified Oswestry score >=20 points for improved mobility.        Start:  10/10/24    Expected End:  01/08/25

## 2024-10-23 ENCOUNTER — TREATMENT (OUTPATIENT)
Dept: PHYSICAL THERAPY | Facility: HOSPITAL | Age: 74
End: 2024-10-23
Payer: MEDICARE

## 2024-10-23 DIAGNOSIS — M54.16 CHRONIC RIGHT-SIDED LUMBAR RADICULOPATHY: ICD-10-CM

## 2024-10-23 DIAGNOSIS — R29.898 WEAKNESS OF RIGHT LOWER EXTREMITY: ICD-10-CM

## 2024-10-23 PROCEDURE — 97110 THERAPEUTIC EXERCISES: CPT | Mod: GP,CQ

## 2024-10-23 NOTE — PROGRESS NOTES
"Physical Therapy    Physical Therapy Treatment    Patient Name: Jonathan Watts  MRN: [id number]  : 1950   Today's Date: 10/23/2024  Time Calculation  Start Time: 1115  Stop Time: 1200  Time Calculation (min): 45 min  Visit #4    PT Therapeutic Procedures Time Entry  Therapeutic Exercise Time Entry: 45          Current Problem  1. Chronic right-sided lumbar radiculopathy  Follow Up In Physical Therapy      2. Weakness of right lower extremity  Follow Up In Physical Therapy            Subjective   Pt states he has mild pain today        Precautions   STEADI Fall Risk Score (The score of 4 or more indicates an increased risk of falling): 3  Precautions Comment: Cal inguinal hernia          Pain   4/10    Objective    See flow sheet       Treatments:  EXERCISES Date 10/16/2024  Date 10/18/2024  Date 10/23/24 Date   VISIT# #2 #3 #4 #    REPS REPS REPS REPS   Nu-Step L2 10 min L2 10 min L2 10 min           Shuttle:        DLP 4B 2x10 4B 2x10 5B 2x10    SLP 4B 2x10 Cal 4B 2x10 4B 2x10           Parallel bars:       Mini squats 2x10 2x10 2x10    Marches 2x10 Cal 2x10 Cal 2x10 Cal    HR 2x10 2x10 2x10    Step ups  8in step x10 Cal 8in step x10 Cal    Lateral step ups  8in step Cal 8in step x10 Cal           Ball:       DKTC 2x10 Green ball 2x10 Green ball 2x10    ROT 2x10 Green ball 2x10 Green ball 2x10           Piriformis stretch  Supine; x2 30\" H Supine; x2 30\" H           PPT       PPT with marching       Bridges 2x10 2x10 2x10    T-band hip ABD/ER 1x10                           HEP: Access Code: [email or web address]  Exercises  - Supine Single Knee to Chest  - 2 x daily - 3 sets - 30 seconds hold  - Supine Double Knee to Chest  - 2 x daily - 3 sets - 30 seconds hold  - Supine Lower Trunk Rotation  - 2 x daily - 3 reps - 30 hold  - Seated Hamstring Stretch  - 2 x daily - 3 sets - 30 seconds hold Reviewed HEP with Pt Reviewed w/ Pt       Assessment:  Pt tolerated all exercises with no increased pain. Pt able to complete exercises " with minimal challenge.        Plan:   OP PT Plan  Treatment/Interventions: Cryotherapy, Education/ Instruction, Hot pack, Manual therapy, Therapeutic exercises  PT Plan: Skilled PT  PT Frequency: 2 times per week  Duration: 4-6 wks  Certification Period Start Date: 09/27/24  Certification Period End Date: 09/27/25  Number of Treatments Authorized: Med nec  Rehab Potential: Excellent  Plan of Care Agreement: Patient    OP EDUCATION:       Goals:  Active       Right lumbar radiculopathy       Trunk AROM WNL and pain-free for improved mobility.         Start:  10/10/24    Expected End:  01/08/25            Improve hamstring flexibility >=15 deg for improved mobility.       Start:  10/10/24    Expected End:  01/08/25            5/5 pierre LE/trunk strength for improved getting up off the floor.        Start:  10/10/24    Expected End:  01/08/25            <=1/10 pain with daily activities for improved house/yard work.        Start:  10/10/24    Expected End:  01/08/25            Improve Modified Oswestry score >=20 points for improved mobility.        Start:  10/10/24    Expected End:  01/08/25

## 2024-10-28 ENCOUNTER — TREATMENT (OUTPATIENT)
Dept: PHYSICAL THERAPY | Facility: HOSPITAL | Age: 74
End: 2024-10-28
Payer: MEDICARE

## 2024-10-28 DIAGNOSIS — R29.898 WEAKNESS OF RIGHT LOWER EXTREMITY: ICD-10-CM

## 2024-10-28 DIAGNOSIS — M54.16 CHRONIC RIGHT-SIDED LUMBAR RADICULOPATHY: ICD-10-CM

## 2024-10-28 PROCEDURE — 97110 THERAPEUTIC EXERCISES: CPT | Mod: GP,CQ

## 2024-10-28 ASSESSMENT — PAIN DESCRIPTION - DESCRIPTORS: DESCRIPTORS: TIGHTNESS

## 2024-10-28 ASSESSMENT — PAIN SCALES - GENERAL: PAINLEVEL_OUTOF10: 5 - MODERATE PAIN

## 2024-10-28 ASSESSMENT — PAIN - FUNCTIONAL ASSESSMENT: PAIN_FUNCTIONAL_ASSESSMENT: 0-10

## 2024-10-30 ENCOUNTER — TREATMENT (OUTPATIENT)
Dept: PHYSICAL THERAPY | Facility: HOSPITAL | Age: 74
End: 2024-10-30
Payer: MEDICARE

## 2024-10-30 DIAGNOSIS — R29.898 WEAKNESS OF RIGHT LOWER EXTREMITY: ICD-10-CM

## 2024-10-30 DIAGNOSIS — M54.16 CHRONIC RIGHT-SIDED LUMBAR RADICULOPATHY: ICD-10-CM

## 2024-10-30 PROCEDURE — 97110 THERAPEUTIC EXERCISES: CPT | Mod: GP,CQ

## 2024-11-06 ENCOUNTER — TREATMENT (OUTPATIENT)
Dept: PHYSICAL THERAPY | Facility: HOSPITAL | Age: 74
End: 2024-11-06
Payer: MEDICARE

## 2024-11-06 DIAGNOSIS — M54.16 CHRONIC RIGHT-SIDED LUMBAR RADICULOPATHY: ICD-10-CM

## 2024-11-06 DIAGNOSIS — R29.898 WEAKNESS OF RIGHT LOWER EXTREMITY: ICD-10-CM

## 2024-11-06 PROCEDURE — 97110 THERAPEUTIC EXERCISES: CPT | Mod: GP | Performed by: PHYSICAL THERAPIST

## 2024-11-06 ASSESSMENT — PAIN SCALES - GENERAL: PAINLEVEL_OUTOF10: 5 - MODERATE PAIN

## 2024-11-06 ASSESSMENT — PAIN - FUNCTIONAL ASSESSMENT: PAIN_FUNCTIONAL_ASSESSMENT: 0-10

## 2024-11-06 NOTE — PROGRESS NOTES
Physical Therapy    Physical Therapy Treatment / Discharge    Patient Name: Jonathan Watts  MRN: [id number]  : 1950   Today's Date: 2024  Time Calculation  Start Time: 1300  Stop Time: 1340  Time Calculation (min): 40 min       PT Therapeutic Procedures Time Entry  Therapeutic Exercise Time Entry: 40          Visit #7    Assessment:   Pt with small improvements in pain levels and weightbearing tolerance. He has upcoming cal inguinal hernia surgery so he will be discharged from PT to continue working independently on HEP.      Plan:   Discharge to independent HEP.    Current Problem  1. Chronic right-sided lumbar radiculopathy  Follow Up In Physical Therapy      2. Weakness of right lower extremity  Follow Up In Physical Therapy          Subjective   General  Pt states his pain is not as intense but continues to have cal posterior thigh pain, maybe walking a little longer. Also limited by cal inguinal hernias that are scheduled to be repaired in December.     Precautions  Precautions  Precautions Comment: Cal inguinal hernia    Pain  Pain Assessment: 0-10  0-10 (Numeric) Pain Score: 5 - Moderate pain  Pain Type: Chronic pain  Pain Location: Leg  Pain Orientation: Right, Left    Objective   Gait: antalgic right    Posture: decreased lumbar lordosis    Lumbar ROM  FLEX: -50% with hamstring tightness  EXT: WNL  SB R: WNL  SB L: WNL    LE strength:  Grossly WNL cal    Hamstrings: 50 deg cal  Posterior tightness with SKTC and DKTC  Lateral tightness with knee rotations cal    Slump test: (-) cal  SLR: (-) cal  LE Pull: NC    Thigh thrust: (-) cal    Outcome Measures:  Other Measures  Oswestry Disablity Index (DAVIDSON): 24%    Treatments:  EXERCISES Date 10/23/24 Date 10/30/24 Date 2024    VISIT# #4 # 6 #7    REPS REPS     Nu-Step L2 10 min L3 10min L3 10min           Shuttle:        DLP 5B 2x10 5B 2x10 5B 2x10    SLP 4B 2x10 4B [id number]  4B 3p24Cgb            Parallel bars:       Mini squats 2x10 HEP       "2x10 Cal 8k07Paq  6q27Xax     HR 2x10 HEP     Step ups 8in step x10 Cal      Lateral step ups 8in step x10 Cal 8inch 2x10 Cal  8inch 2x10 Cal            Ball:       DKTC Green ball 2x10 Orange ball 2x10 Orange ball 2x10    ROT Green ball 2x10 Orange ball 2x10 Orange ball 2x10           Piriformis stretch Supine; x2 30\" H [id number] Cal Supine  [id number] Cal Supine            PPT       PPT with marching   2 x 10    Bridges 2x10 2x10 2x10    T-band hip ABD/ER   Red 2 x10                       Reassess and review/progress HEP - 40 min    HEP: Access Code: [email or web address]  Exercises  - Supine Single Knee to Chest  - 2 x daily - 3 sets - 30 seconds hold  - Supine Double Knee to Chest  - 2 x daily - 3 sets - 30 seconds hold  - Supine Lower Trunk Rotation  - 2 x daily - 3 reps - 30 hold  - Seated Hamstring Stretch  - 2 x daily - 3 sets - 30 seconds hold    Access Code: [email or web address]  URL: [email or web address]  Date: 11/06/2024  Prepared by: Lam Galeana    Exercises  - Supine Posterior Pelvic Tilt  - 3 x weekly - 1-2 sets - 10 reps - 3 seconds hold  - Supine March with Posterior Pelvic Tilt  - 3 x weekly - 2-3 sets - 10 reps  - Supine Bridge  - 3 x weekly - 2-3 sets - 10 reps  - Hooklying Clamshell with Resistance  - 3 x weekly - 2-3 sets - 10 reps     Goals:  Resolved       Right lumbar radiculopathy       Trunk AROM WNL and pain-free for improved mobility.   (Adequate for Discharge)       Start:  10/10/24    Expected End:  01/08/25            Improve hamstring flexibility >=15 deg for improved mobility. (Adequate for Discharge)       Start:  10/10/24    Expected End:  01/08/25            5/5 cal LE/trunk strength for improved getting up off the floor.  (Met)       Start:  10/10/24    Expected End:  01/08/25    Resolved:  11/06/24         <=1/10 pain with daily activities for improved house/yard work.  (Adequate for Discharge)       Start:  10/10/24    Expected End:  01/08/25            Improve Modified Oswestry " score >=20 points for improved mobility.  (Adequate for Discharge)       Start:  10/10/24    Expected End:  01/08/25

## 2024-12-05 ENCOUNTER — PRE-ADMISSION TESTING (OUTPATIENT)
Dept: PREADMISSION TESTING | Facility: HOSPITAL | Age: 74
End: 2024-12-05
Payer: MEDICARE

## 2024-12-05 VITALS
SYSTOLIC BLOOD PRESSURE: 142 MMHG | OXYGEN SATURATION: 95 % | RESPIRATION RATE: 20 BRPM | TEMPERATURE: 96.8 F | HEART RATE: 60 BPM | DIASTOLIC BLOOD PRESSURE: 86 MMHG | HEIGHT: 65 IN | WEIGHT: 181 LBS | BODY MASS INDEX: 30.16 KG/M2

## 2024-12-05 DIAGNOSIS — I10 ESSENTIAL HYPERTENSION: ICD-10-CM

## 2024-12-05 DIAGNOSIS — E11.65 TYPE 2 DIABETES MELLITUS WITH HYPERGLYCEMIA, WITHOUT LONG-TERM CURRENT USE OF INSULIN: ICD-10-CM

## 2024-12-05 DIAGNOSIS — K40.20 BILATERAL INGUINAL HERNIA WITHOUT OBSTRUCTION OR GANGRENE, RECURRENCE NOT SPECIFIED: ICD-10-CM

## 2024-12-05 DIAGNOSIS — Z01.818 PRE-OP EVALUATION: Primary | ICD-10-CM

## 2024-12-05 LAB
ANION GAP SERPL CALC-SCNC: 11 MMOL/L (ref 10–20)
BUN SERPL-MCNC: 15 MG/DL (ref 6–23)
CALCIUM SERPL-MCNC: 9.3 MG/DL (ref 8.6–10.3)
CHLORIDE SERPL-SCNC: 104 MMOL/L (ref 98–107)
CO2 SERPL-SCNC: 27 MMOL/L (ref 21–32)
CREAT SERPL-MCNC: 1.04 MG/DL (ref 0.5–1.3)
EGFRCR SERPLBLD CKD-EPI 2021: 74 ML/MIN/1.73M*2
ERYTHROCYTE [DISTWIDTH] IN BLOOD BY AUTOMATED COUNT: 14.6 % (ref 11.5–14.5)
GLUCOSE SERPL-MCNC: 115 MG/DL (ref 74–99)
HCT VFR BLD AUTO: 43.8 % (ref 41–52)
HGB BLD-MCNC: 14.6 G/DL (ref 13.5–17.5)
MCH RBC QN AUTO: 29.4 PG (ref 26–34)
MCHC RBC AUTO-ENTMCNC: 33.3 G/DL (ref 32–36)
MCV RBC AUTO: 88 FL (ref 80–100)
NRBC BLD-RTO: 0 /100 WBCS (ref 0–0)
PLATELET # BLD AUTO: 263 X10*3/UL (ref 150–450)
POTASSIUM SERPL-SCNC: 3.9 MMOL/L (ref 3.5–5.3)
RBC # BLD AUTO: 4.97 X10*6/UL (ref 4.5–5.9)
SODIUM SERPL-SCNC: 138 MMOL/L (ref 136–145)
WBC # BLD AUTO: 8.7 X10*3/UL (ref 4.4–11.3)

## 2024-12-05 PROCEDURE — 85027 COMPLETE CBC AUTOMATED: CPT

## 2024-12-05 PROCEDURE — 93010 ELECTROCARDIOGRAM REPORT: CPT | Performed by: INTERNAL MEDICINE

## 2024-12-05 PROCEDURE — 36415 COLL VENOUS BLD VENIPUNCTURE: CPT

## 2024-12-05 PROCEDURE — 82374 ASSAY BLOOD CARBON DIOXIDE: CPT

## 2024-12-05 ASSESSMENT — ENCOUNTER SYMPTOMS
ALLERGIC/IMMUNOLOGIC NEGATIVE: 1
HEMATOLOGIC/LYMPHATIC NEGATIVE: 1
ABDOMINAL PAIN: 1
CONSTITUTIONAL NEGATIVE: 1
ENDOCRINE NEGATIVE: 1
EYES NEGATIVE: 1
CARDIOVASCULAR NEGATIVE: 1
RESPIRATORY NEGATIVE: 1
MUSCULOSKELETAL NEGATIVE: 1
PSYCHIATRIC NEGATIVE: 1
NEUROLOGICAL NEGATIVE: 1

## 2024-12-05 ASSESSMENT — LIFESTYLE VARIABLES: SMOKING_STATUS: NONSMOKER

## 2024-12-05 NOTE — PREPROCEDURE INSTRUCTIONS
Medication List            Accurate as of December 5, 2024  3:00 PM. Always use your most recent med list.                albuterol 90 mcg/actuation inhaler  Commonly known as: Ventolin HFA  Inhale 2 puffs every 4 hours if needed for wheezing or shortness of breath.     aspirin 81 mg EC tablet     atorvastatin 10 mg tablet  Commonly known as: Lipitor  TAKE 1 TABLET BY MOUTH EVERY DAY     chlorthalidone 25 mg tablet  Commonly known as: Hygroton  TAKE 1 TABLET BY MOUTH EVERY DAY     losartan 100 mg tablet  Commonly known as: Cozaar  TAKE 1 TABLET BY MOUTH EVERY DAY     metFORMIN 500 mg tablet  Commonly known as: Glucophage  TAKE 1 TABLET BY MOUTH TWICE A DAY WITH FOOD     omeprazole 20 mg DR capsule  Commonly known as: PriLOSEC  TAKE 1 CAPSULE BY MOUTH EVERY DAY IN THE MORNING BEFORE BREAKFAST     sertraline 50 mg tablet  Commonly known as: Zoloft  TAKE 1 TABLET BY MOUTH EVERY DAY     SUMAtriptan 25 mg tablet  Commonly known as: Imitrex                              NPO Instructions:    Nothing to eat after midnight  On 12/11 you will receive a call with arrival time and given anesthesia guidelines for water intake, 12 ounces water 2 hours before arrival time  No medications morning of surgery   Hydrate 2 -3 days before surgery  No aspirin morning of surgery    Additional Instructions:     Dress comfortably, sweats day of surgery  Shower morning of surgery, deodorant only   Call with any questions [phone]

## 2024-12-05 NOTE — H&P
History Of Present Illness  Jonathan Watts is a very pleasant 74 y.o. male presenting with bilateral inguinal hernias. Patient is scheduled for a bilateral hernia repair under general anesthesia per Dr. Curtis on 12/12/24.      Past Medical History  Past Medical History:   Diagnosis Date    Abnormal posture 12/04/2020    Poor posture    Anxiety     BPH (benign prostatic hyperplasia)     Cataract     right    Complex regional pain syndrome i of upper limb, bilateral 07/21/2020    Complex regional pain syndrome affecting both upper arms    Easy bruising     Encounter for screening for malignant neoplasm of prostate 03/16/2021    Prostate cancer screening    GERD (gastroesophageal reflux disease)     Hearing aid worn     bilateral    HL (hearing loss)     Hyperlipidemia     Hypertension     Impingement syndrome of left shoulder 11/17/2020    Impingement syndrome of left shoulder    Impingement syndrome of right shoulder 03/16/2021    Impingement syndrome of both shoulders    Migraine, unspecified, not intractable, without status migrainosus     Migraines    Other intervertebral disc degeneration, lumbar region 10/23/2020    Lumbar degenerative disc disease    Other obesity due to excess calories 03/22/2022    Class 1 obesity due to excess calories with serious comorbidity and body mass index (BMI) of 30.0 to 30.9 in adult    Pain in left leg 09/18/2020    Pain of left lower extremity    Pain in right hip 11/17/2020    Hip pain, bilateral    Pain in right leg 08/03/2020    Bilateral leg pain    Pain in right leg 10/01/2020    Pain of right lower extremity    Paresthesia of skin 07/21/2020    Paresthesia and pain of both upper extremities    Personal history of other diseases of male genital organs     History of erectile dysfunction    Personal history of other diseases of the circulatory system     History of hypertension    Personal history of other diseases of the digestive system     History of gastroesophageal reflux  (GERD)    Personal history of other diseases of the nervous system and sense organs     History of cataract    Personal history of other specified conditions 03/02/2020    History of chronic cough    Personal history of other specified conditions 07/21/2020    History of impaired glucose tolerance    Personal history of other specified conditions 09/08/2020    History of fatigue    Personal history of other specified conditions 09/18/2020    History of numbness    Polymyalgia rheumatica (Multi) 09/08/2020    Polymyalgia    Radiculopathy, lumbar region 11/17/2020    Lumbar radicular pain    Spinal stenosis, lumbar region with neurogenic claudication 10/23/2020    Lumbar stenosis with neurogenic claudication    Type 2 diabetes mellitus     Unspecified mononeuropathy of bilateral lower limbs 09/08/2020    Neuropathic pain, leg, bilateral    Vision loss     Weakness 12/04/2020    Decreased strength    Wears partial dentures     lower       Surgical History  Past Surgical History:   Procedure Laterality Date    CATARACT EXTRACTION W/  INTRAOCULAR LENS IMPLANT Left     OTHER SURGICAL HISTORY  02/27/2020    Cataract surgery        Social History  He reports that he has quit smoking. His smoking use included cigarettes. He has never used smokeless tobacco. He reports that he does not currently use alcohol. He reports that he does not use drugs.    Family History  Family History   Problem Relation Name Age of Onset    Stomach cancer Mother      Stroke Father      Coronary artery disease Father          Allergies  Patient has no known allergies.    Review of Systems   Constitutional: Negative.    HENT: Negative.     Eyes: Negative.    Respiratory: Negative.     Cardiovascular: Negative.    Gastrointestinal:  Positive for abdominal pain.        Cal inguinal hernias. Patient states right now pain is a 0/10 while sitting down.  Pain is worse with standing or walking. Left side is worse than right per patient.   He doesn't  "attempt to reduce the hernias.    Endocrine: Negative.    Genitourinary: Negative.    Musculoskeletal: Negative.    Skin: Negative.    Allergic/Immunologic: Negative.    Neurological: Negative.    Hematological: Negative.    Psychiatric/Behavioral: Negative.     All other systems reviewed and are negative.       Physical Exam  Vitals and nursing note reviewed.   Constitutional:       Appearance: Normal appearance.   HENT:      Head: Normocephalic.      Nose: Nose normal.      Mouth/Throat:      Mouth: Mucous membranes are moist.      Pharynx: Oropharynx is clear.      Comments:   Mallampati: 1  TMD: >3  Finger breadth: 3  Dentition:  lower partial   Neck ROM: full   Eyes:      Pupils: Pupils are equal, round, and reactive to light.   Cardiovascular:      Rate and Rhythm: Normal rate and regular rhythm.      Heart sounds: Normal heart sounds, S1 normal and S2 normal.   Pulmonary:      Effort: Pulmonary effort is normal.      Breath sounds: Normal breath sounds.      Comments: Lungs clear throughout all fields.   Abdominal:      General: Bowel sounds are normal.      Palpations: Abdomen is soft.      Hernia: A hernia is present. Hernia is present in the left inguinal area and right inguinal area.      Comments: Bowel sounds active x4 quads    Musculoskeletal:         General: Normal range of motion.      Cervical back: Normal range of motion and neck supple.      Right lower leg: No edema.      Left lower leg: No edema.   Skin:     General: Skin is warm and dry.   Neurological:      General: No focal deficit present.      Mental Status: He is alert and oriented to person, place, and time.   Psychiatric:         Mood and Affect: Mood normal.         Behavior: Behavior normal.         Thought Content: Thought content normal.         Judgment: Judgment normal.          Last Recorded Vitals  Blood pressure 142/86, pulse 60, temperature 36 °C (96.8 °F), temperature source Oral, resp. rate 20, height 1.651 m (5' 5\"), weight " 82.1 kg (181 lb), SpO2 95%.    /86   Pulse 60   Temp 36 °C (96.8 °F) (Oral)   Resp 20       DASI Risk Score    No data to display       Caprini DVT Assessment      Flowsheet Row Pre-Admission Testing from 12/5/2024 in  [facility]   DVT Score 7 filed at 12/05/2024 0959   Surgical Factors Major surgery planned, lasting 2-3 hours filed at 12/05/2024 0959   BMI 30 or less filed at 12/05/2024 0959          Modified Frailty Index    No data to display       CHADS2 Stroke Risk  Current as of 4 hours ago        N/A 3 to 100%: High Risk   2 to < 3%: Medium Risk   0 to < 2%: Low Risk     Last Change: N/A          This score determines the patient's risk of having a stroke if the patient has atrial fibrillation.        This score is not applicable to this patient. Components are not calculated.          Revised Cardiac Risk Index      Flowsheet Row Pre-Admission Testing from 12/5/2024 in  [facility]   High-Risk Surgery (Intraperitoneal, Intrathoracic,Suprainguinal vascular) 1 filed at 12/05/2024 0959   History of ischemic heart disease (History of MI, History of positive exercuse test, Current chest paint considered due to myocardial ischemia, Use of nitrate therapy, ECG with pathological Q Waves) 0 filed at 12/05/2024 0959   History of congestive heart failure (pulmonary edemia, bilateral rales or S3 gallop, Paroxysmal nocturnal dyspnea, CXR showing pulmonary vascular redistribution) 0 filed at 12/05/2024 0959   History of cerebrovascular disease (Prior TIA or stroke) 0 filed at 12/05/2024 0959   Pre-operative insulin treatment 0 filed at 12/05/2024 0959   Pre-operative creatinine>2 mg/dl 0 filed at 12/05/2024 0959   Revised Cardiac Risk Calculator 1 filed at 12/05/2024 0959          Apfel Simplified Score      Flowsheet Row Pre-Admission Testing from 12/5/2024 in  [facility]   Smoking status 1 filed at 12/05/2024 0959   History of motion sickness or PONV  0 filed at  12/05/2024 0959   Use of postoperative opioids 1 filed at 12/05/2024 0959   Gender - Female 0=No filed at 12/05/2024 0959   Apfel Simplified Score Calculator 2 filed at 12/05/2024 0959          Risk Analysis Index Results This Encounter    No data found in the last 10 encounters.       Stop Bang Score      Flowsheet Row Pre-Admission Testing from 12/5/2024 in  [facility]   Do you snore loudly? 0 filed at 12/05/2024 1451   Do you often feel tired or fatigued after your sleep? 0 filed at 12/05/2024 1451   Has anyone ever observed you stop breathing in your sleep? 0 filed at 12/05/2024 1451   Do you have or are you being treated for high blood pressure? 1 filed at 12/05/2024 1451   Recent BMI (Calculated) 29 filed at 12/05/2024 1451   Is BMI greater than 35 kg/m2? 0=No filed at 12/05/2024 1451   Age older than 50 years old? 1=Yes filed at 12/05/2024 1451   Is your neck circumference greater than 17 inches (Male) or 16 inches (Female)? 0 filed at 12/05/2024 1451   Gender - Male 1=Yes filed at 12/05/2024 1451   STOP-BANG Total Score 3 filed at 12/05/2024 1451          Prodigy: High Risk  Total Score: 20              Prodigy Age Score      Prodigy Gender Score          ARISCAT Score for Postoperative Pulmonary Complications    No data to display       Guzman Perioperative Risk for Myocardial Infarction or Cardiac Arrest (VERITO)    No data to display       Current Outpatient Medications on File Prior to Visit   Medication Sig Dispense Refill    albuterol (Ventolin HFA) 90 mcg/actuation inhaler Inhale 2 puffs every 4 hours if needed for wheezing or shortness of breath. (Patient not taking: Reported on 12/5/2024) 8 g 5    aspirin 81 mg EC tablet Take 1 tablet (81 mg) by mouth once daily.      atorvastatin (Lipitor) 10 mg tablet TAKE 1 TABLET BY MOUTH EVERY DAY 90 tablet 3    chlorthalidone (Hygroton) 25 mg tablet TAKE 1 TABLET BY MOUTH EVERY DAY 90 tablet 3    losartan (Cozaar) 100 mg tablet TAKE 1 TABLET BY  MOUTH EVERY DAY 90 tablet 3    metFORMIN (Glucophage) 500 mg tablet TAKE 1 TABLET BY MOUTH TWICE A DAY WITH FOOD 180 tablet 3    omeprazole (PriLOSEC) 20 mg DR capsule TAKE 1 CAPSULE BY MOUTH EVERY DAY IN THE MORNING BEFORE BREAKFAST 90 capsule 3    sertraline (Zoloft) 50 mg tablet TAKE 1 TABLET BY MOUTH EVERY DAY 90 tablet 3    SUMAtriptan (Imitrex) 25 mg tablet Take 1 tablet (25 mg) by mouth once daily as needed for migraine.       No current facility-administered medications on file prior to visit.      Relevant Results  Results for orders placed or performed in visit on 12/05/24 (from the past 24 hours)   Basic Metabolic Panel   Result Value Ref Range    Glucose 115 (H) 74 - 99 mg/dL    Sodium 138 136 - 145 mmol/L    Potassium 3.9 3.5 - 5.3 mmol/L    Chloride 104 98 - 107 mmol/L    Bicarbonate 27 21 - 32 mmol/L    Anion Gap 11 10 - 20 mmol/L    Urea Nitrogen 15 6 - 23 mg/dL    Creatinine 1.04 0.50 - 1.30 mg/dL    eGFR 74 >60 mL/min/1.73m*2    Calcium 9.3 8.6 - 10.3 mg/dL   CBC   Result Value Ref Range    WBC 8.7 4.4 - 11.3 x10*3/uL    nRBC 0.0 0.0 - 0.0 /100 WBCs    RBC 4.97 4.50 - 5.90 x10*6/uL    Hemoglobin 14.6 13.5 - 17.5 g/dL    Hematocrit 43.8 41.0 - 52.0 %    MCV 88 80 - 100 fL    MCH 29.4 26.0 - 34.0 pg    MCHC 33.3 32.0 - 36.0 g/dL    RDW 14.6 (H) 11.5 - 14.5 %    Platelets 263 150 - 450 x10*3/uL               Assessment/Plan   Problem List Items Addressed This Visit       Essential hypertension    Relevant Orders    Basic Metabolic Panel    ECG 12 Lead    Type 2 diabetes mellitus with hyperglycemia, without long-term current use of insulin    Relevant Orders    Basic Metabolic Panel    ECG 12 Lead     Other Visit Diagnoses       Pre-op evaluation    -  Primary    Relevant Orders    Basic Metabolic Panel    CBC    ECG 12 Lead    Bilateral inguinal hernia without obstruction or gangrene, recurrence not specified        Relevant Orders    Basic Metabolic Panel    CBC               Patient is scheduled for  a bilateral hernia repair under general anesthesia per Dr. Curtis on 12/12/24.   CBC, BMP ordered. Reviewed and these are WNL and acceptable for upcoming surgery.   EKG today shows SR, rate of 54 bpm.  H&P and airway assessment completed today.  Ok to continue ASA without stopping.   All surgery instructions reviewed with patient by RN. Verbalized understanding.     Althea Qiu, APRN-CNS

## 2024-12-09 ENCOUNTER — HOSPITAL ENCOUNTER (OUTPATIENT)
Dept: CARDIOLOGY | Facility: HOSPITAL | Age: 74
Discharge: HOME | End: 2024-12-09
Payer: MEDICARE

## 2024-12-09 ENCOUNTER — ANESTHESIA EVENT (OUTPATIENT)
Dept: OPERATING ROOM | Facility: HOSPITAL | Age: 74
End: 2024-12-09
Payer: MEDICARE

## 2024-12-09 DIAGNOSIS — E11.65 TYPE 2 DIABETES MELLITUS WITH HYPERGLYCEMIA, WITHOUT LONG-TERM CURRENT USE OF INSULIN: ICD-10-CM

## 2024-12-09 DIAGNOSIS — I10 ESSENTIAL HYPERTENSION: ICD-10-CM

## 2024-12-09 DIAGNOSIS — Z01.818 PRE-OP EVALUATION: ICD-10-CM

## 2024-12-09 LAB
ATRIAL RATE: 54 BPM
P AXIS: 12 DEGREES
PR INTERVAL: 170 MS
Q ONSET: 249 MS
QRS COUNT: 9 BEATS
QRS DURATION: 94 MS
QT INTERVAL: 470 MS
QTC CALCULATION(BAZETT): 446 MS
QTC FREDERICIA: 453 MS
R AXIS: -12 DEGREES
T AXIS: 10 DEGREES
T OFFSET: 484 MS
VENTRICULAR RATE: 54 BPM

## 2024-12-09 PROCEDURE — 93005 ELECTROCARDIOGRAM TRACING: CPT

## 2024-12-12 ENCOUNTER — ANESTHESIA (OUTPATIENT)
Dept: OPERATING ROOM | Facility: HOSPITAL | Age: 74
End: 2024-12-12
Payer: MEDICARE

## 2024-12-12 ENCOUNTER — HOSPITAL ENCOUNTER (OUTPATIENT)
Facility: HOSPITAL | Age: 74
Setting detail: OUTPATIENT SURGERY
Discharge: HOME | End: 2024-12-12
Attending: SURGERY | Admitting: SURGERY
Payer: MEDICARE

## 2024-12-12 ENCOUNTER — PHARMACY VISIT (OUTPATIENT)
Dept: PHARMACY | Facility: CLINIC | Age: 74
End: 2024-12-12
Payer: COMMERCIAL

## 2024-12-12 VITALS
HEART RATE: 70 BPM | WEIGHT: 177 LBS | SYSTOLIC BLOOD PRESSURE: 119 MMHG | HEIGHT: 65 IN | OXYGEN SATURATION: 99 % | DIASTOLIC BLOOD PRESSURE: 76 MMHG | RESPIRATION RATE: 14 BRPM | BODY MASS INDEX: 29.49 KG/M2 | TEMPERATURE: 98.2 F

## 2024-12-12 DIAGNOSIS — K40.20 BILATERAL INGUINAL HERNIA WITHOUT OBSTRUCTION OR GANGRENE, RECURRENCE NOT SPECIFIED: ICD-10-CM

## 2024-12-12 LAB — GLUCOSE BLD MANUAL STRIP-MCNC: 128 MG/DL (ref 74–99)

## 2024-12-12 PROCEDURE — 88304 TISSUE EXAM BY PATHOLOGIST: CPT | Performed by: PATHOLOGY

## 2024-12-12 PROCEDURE — 2500000004 HC RX 250 GENERAL PHARMACY W/ HCPCS (ALT 636 FOR OP/ED): Performed by: NURSE ANESTHETIST, CERTIFIED REGISTERED

## 2024-12-12 PROCEDURE — 2720000007 HC OR 272 NO HCPCS: Performed by: SURGERY

## 2024-12-12 PROCEDURE — 2500000004 HC RX 250 GENERAL PHARMACY W/ HCPCS (ALT 636 FOR OP/ED): Mod: JZ

## 2024-12-12 PROCEDURE — 2500000004 HC RX 250 GENERAL PHARMACY W/ HCPCS (ALT 636 FOR OP/ED): Performed by: ANESTHESIOLOGY

## 2024-12-12 PROCEDURE — C1781 MESH (IMPLANTABLE): HCPCS | Performed by: SURGERY

## 2024-12-12 PROCEDURE — 7100000009 HC PHASE TWO TIME - INITIAL BASE CHARGE: Performed by: SURGERY

## 2024-12-12 PROCEDURE — 3600000008 HC OR TIME - EACH INCREMENTAL 1 MINUTE - PROCEDURE LEVEL THREE: Performed by: SURGERY

## 2024-12-12 PROCEDURE — 3700000001 HC GENERAL ANESTHESIA TIME - INITIAL BASE CHARGE: Performed by: SURGERY

## 2024-12-12 PROCEDURE — 7100000010 HC PHASE TWO TIME - EACH INCREMENTAL 1 MINUTE: Performed by: SURGERY

## 2024-12-12 PROCEDURE — 7100000001 HC RECOVERY ROOM TIME - INITIAL BASE CHARGE: Performed by: SURGERY

## 2024-12-12 PROCEDURE — 2500000005 HC RX 250 GENERAL PHARMACY W/O HCPCS: Performed by: NURSE ANESTHETIST, CERTIFIED REGISTERED

## 2024-12-12 PROCEDURE — 82947 ASSAY GLUCOSE BLOOD QUANT: CPT

## 2024-12-12 PROCEDURE — 0752T DGTZ GLS MCRSCP SLD LVL III: CPT | Mod: TC,PORLAB | Performed by: SURGERY

## 2024-12-12 PROCEDURE — 3700000002 HC GENERAL ANESTHESIA TIME - EACH INCREMENTAL 1 MINUTE: Performed by: SURGERY

## 2024-12-12 PROCEDURE — 3600000003 HC OR TIME - INITIAL BASE CHARGE - PROCEDURE LEVEL THREE: Performed by: SURGERY

## 2024-12-12 PROCEDURE — 7100000002 HC RECOVERY ROOM TIME - EACH INCREMENTAL 1 MINUTE: Performed by: SURGERY

## 2024-12-12 PROCEDURE — 2780000003 HC OR 278 NO HCPCS: Performed by: SURGERY

## 2024-12-12 PROCEDURE — 2500000004 HC RX 250 GENERAL PHARMACY W/ HCPCS (ALT 636 FOR OP/ED): Performed by: SURGERY

## 2024-12-12 PROCEDURE — RXMED WILLOW AMBULATORY MEDICATION CHARGE

## 2024-12-12 DEVICE — BARD MESH, 6" X 6" (15 CM X 15 CM)
Type: IMPLANTABLE DEVICE | Site: INGUINAL | Status: FUNCTIONAL
Brand: BARD

## 2024-12-12 RX ORDER — SODIUM CHLORIDE, SODIUM LACTATE, POTASSIUM CHLORIDE, CALCIUM CHLORIDE 600; 310; 30; 20 MG/100ML; MG/100ML; MG/100ML; MG/100ML
100 INJECTION, SOLUTION INTRAVENOUS CONTINUOUS
Status: DISCONTINUED | OUTPATIENT
Start: 2024-12-12 | End: 2024-12-12 | Stop reason: HOSPADM

## 2024-12-12 RX ORDER — ALBUTEROL SULFATE 0.83 MG/ML
2.5 SOLUTION RESPIRATORY (INHALATION) ONCE AS NEEDED
Status: DISCONTINUED | OUTPATIENT
Start: 2024-12-12 | End: 2024-12-12 | Stop reason: HOSPADM

## 2024-12-12 RX ORDER — CEFAZOLIN SODIUM 2 G/100ML
2 INJECTION, SOLUTION INTRAVENOUS ONCE
Status: COMPLETED | OUTPATIENT
Start: 2024-12-12 | End: 2024-12-12

## 2024-12-12 RX ORDER — CEFAZOLIN SODIUM 2 G/100ML
2 INJECTION, SOLUTION INTRAVENOUS ONCE
Status: DISCONTINUED | OUTPATIENT
Start: 2024-12-12 | End: 2024-12-12

## 2024-12-12 RX ORDER — LIDOCAINE HCL/PF 100 MG/5ML
SYRINGE (ML) INTRAVENOUS AS NEEDED
Status: DISCONTINUED | OUTPATIENT
Start: 2024-12-12 | End: 2024-12-12

## 2024-12-12 RX ORDER — PHENYLEPHRINE HCL IN 0.9% NACL 1 MG/10 ML
SYRINGE (ML) INTRAVENOUS AS NEEDED
Status: DISCONTINUED | OUTPATIENT
Start: 2024-12-12 | End: 2024-12-12

## 2024-12-12 RX ORDER — ROCURONIUM BROMIDE 50 MG/5 ML
SYRINGE (ML) INTRAVENOUS AS NEEDED
Status: DISCONTINUED | OUTPATIENT
Start: 2024-12-12 | End: 2024-12-12

## 2024-12-12 RX ORDER — HYDRALAZINE HYDROCHLORIDE 20 MG/ML
5 INJECTION INTRAMUSCULAR; INTRAVENOUS EVERY 30 MIN PRN
Status: DISCONTINUED | OUTPATIENT
Start: 2024-12-12 | End: 2024-12-12 | Stop reason: HOSPADM

## 2024-12-12 RX ORDER — MEPERIDINE HYDROCHLORIDE 25 MG/ML
12.5 INJECTION INTRAMUSCULAR; INTRAVENOUS; SUBCUTANEOUS EVERY 10 MIN PRN
Status: DISCONTINUED | OUTPATIENT
Start: 2024-12-12 | End: 2024-12-12 | Stop reason: HOSPADM

## 2024-12-12 RX ORDER — PROPOFOL 10 MG/ML
INJECTION, EMULSION INTRAVENOUS AS NEEDED
Status: DISCONTINUED | OUTPATIENT
Start: 2024-12-12 | End: 2024-12-12

## 2024-12-12 RX ORDER — DIPHENHYDRAMINE HYDROCHLORIDE 50 MG/ML
12.5 INJECTION INTRAMUSCULAR; INTRAVENOUS ONCE AS NEEDED
Status: DISCONTINUED | OUTPATIENT
Start: 2024-12-12 | End: 2024-12-12 | Stop reason: HOSPADM

## 2024-12-12 RX ORDER — OXYCODONE HYDROCHLORIDE 5 MG/1
5 TABLET ORAL EVERY 6 HOURS PRN
Qty: 15 TABLET | Refills: 0 | Status: SHIPPED | OUTPATIENT
Start: 2024-12-12

## 2024-12-12 RX ORDER — LIDOCAINE HYDROCHLORIDE 10 MG/ML
0.1 INJECTION, SOLUTION EPIDURAL; INFILTRATION; INTRACAUDAL; PERINEURAL ONCE
Status: DISCONTINUED | OUTPATIENT
Start: 2024-12-12 | End: 2024-12-12 | Stop reason: HOSPADM

## 2024-12-12 RX ORDER — FAMOTIDINE 10 MG/ML
20 INJECTION INTRAVENOUS ONCE
Status: COMPLETED | OUTPATIENT
Start: 2024-12-12 | End: 2024-12-12

## 2024-12-12 RX ORDER — BUPIVACAINE HYDROCHLORIDE 5 MG/ML
INJECTION, SOLUTION PERINEURAL AS NEEDED
Status: DISCONTINUED | OUTPATIENT
Start: 2024-12-12 | End: 2024-12-12 | Stop reason: HOSPADM

## 2024-12-12 RX ORDER — DROPERIDOL 2.5 MG/ML
0.62 INJECTION, SOLUTION INTRAMUSCULAR; INTRAVENOUS ONCE AS NEEDED
Status: DISCONTINUED | OUTPATIENT
Start: 2024-12-12 | End: 2024-12-12 | Stop reason: HOSPADM

## 2024-12-12 RX ORDER — MORPHINE SULFATE 2 MG/ML
2 INJECTION, SOLUTION INTRAMUSCULAR; INTRAVENOUS EVERY 5 MIN PRN
Status: DISCONTINUED | OUTPATIENT
Start: 2024-12-12 | End: 2024-12-12 | Stop reason: HOSPADM

## 2024-12-12 RX ORDER — POLYETHYLENE GLYCOL 3350 17 G/17G
17 POWDER, FOR SOLUTION ORAL DAILY
Qty: 10 PACKET | Refills: 0 | Status: SHIPPED | OUTPATIENT
Start: 2024-12-12

## 2024-12-12 RX ORDER — FENTANYL CITRATE 50 UG/ML
INJECTION, SOLUTION INTRAMUSCULAR; INTRAVENOUS AS NEEDED
Status: DISCONTINUED | OUTPATIENT
Start: 2024-12-12 | End: 2024-12-12

## 2024-12-12 RX ORDER — NORETHINDRONE AND ETHINYL ESTRADIOL 0.5-0.035
KIT ORAL AS NEEDED
Status: DISCONTINUED | OUTPATIENT
Start: 2024-12-12 | End: 2024-12-12

## 2024-12-12 RX ORDER — ONDANSETRON HYDROCHLORIDE 2 MG/ML
4 INJECTION, SOLUTION INTRAVENOUS ONCE AS NEEDED
Status: DISCONTINUED | OUTPATIENT
Start: 2024-12-12 | End: 2024-12-12 | Stop reason: HOSPADM

## 2024-12-12 RX ORDER — LABETALOL HYDROCHLORIDE 5 MG/ML
5 INJECTION, SOLUTION INTRAVENOUS ONCE AS NEEDED
Status: DISCONTINUED | OUTPATIENT
Start: 2024-12-12 | End: 2024-12-12 | Stop reason: HOSPADM

## 2024-12-12 RX ORDER — ONDANSETRON HYDROCHLORIDE 2 MG/ML
INJECTION, SOLUTION INTRAVENOUS AS NEEDED
Status: DISCONTINUED | OUTPATIENT
Start: 2024-12-12 | End: 2024-12-12

## 2024-12-12 SDOH — HEALTH STABILITY: MENTAL HEALTH: CURRENT SMOKER: 0

## 2024-12-12 ASSESSMENT — ENCOUNTER SYMPTOMS
LIGHT-HEADEDNESS: 0
TROUBLE SWALLOWING: 0
FEVER: 0
COUGH: 0
NECK PAIN: 0
PALPITATIONS: 0
DIFFICULTY URINATING: 0
CHILLS: 0
COLOR CHANGE: 0
CONSTIPATION: 0
WEAKNESS: 0
EYE DISCHARGE: 0
VOMITING: 0
FREQUENCY: 0
ABDOMINAL PAIN: 0
DYSURIA: 0
SPEECH DIFFICULTY: 0
SHORTNESS OF BREATH: 0
EYE REDNESS: 0
ARTHRALGIAS: 0
DIARRHEA: 0
SORE THROAT: 0
NAUSEA: 0
ABDOMINAL DISTENTION: 0
BACK PAIN: 0
DIZZINESS: 0

## 2024-12-12 ASSESSMENT — PAIN SCALES - GENERAL
PAINLEVEL_OUTOF10: 0 - NO PAIN
PAIN_LEVEL: 0
PAINLEVEL_OUTOF10: 0 - NO PAIN

## 2024-12-12 ASSESSMENT — PAIN - FUNCTIONAL ASSESSMENT
PAIN_FUNCTIONAL_ASSESSMENT: 0-10
PAIN_FUNCTIONAL_ASSESSMENT: FLACC (FACE, LEGS, ACTIVITY, CRY, CONSOLABILITY)
PAIN_FUNCTIONAL_ASSESSMENT: 0-10

## 2024-12-12 ASSESSMENT — COLUMBIA-SUICIDE SEVERITY RATING SCALE - C-SSRS
1. IN THE PAST MONTH, HAVE YOU WISHED YOU WERE DEAD OR WISHED YOU COULD GO TO SLEEP AND NOT WAKE UP?: NO
6. HAVE YOU EVER DONE ANYTHING, STARTED TO DO ANYTHING, OR PREPARED TO DO ANYTHING TO END YOUR LIFE?: NO
2. HAVE YOU ACTUALLY HAD ANY THOUGHTS OF KILLING YOURSELF?: NO

## 2024-12-12 NOTE — DISCHARGE INSTRUCTIONS
You may not lift more than 10lbs until followup.  You may shower starting tomorrow.  You may remove your dressings on day 2 after surgery. Your steri strips will fall off on their own  You may have a diet as tolerated.  Do not drive while on narcotic medication.

## 2024-12-12 NOTE — H&P
History Of Present Illness  Jonathan Watts is a 74 y.o. male presenting with bilateral inguinal hernias.     Past Medical History  Past Medical History:   Diagnosis Date    Abnormal posture 12/04/2020    Poor posture    Anxiety     BPH (benign prostatic hyperplasia)     Cataract     right    Complex regional pain syndrome i of upper limb, bilateral 07/21/2020    Complex regional pain syndrome affecting both upper arms    Easy bruising     Encounter for screening for malignant neoplasm of prostate 03/16/2021    Prostate cancer screening    GERD (gastroesophageal reflux disease)     Hearing aid worn     bilateral    HL (hearing loss)     Hyperlipidemia     Hypertension     Impingement syndrome of left shoulder 11/17/2020    Impingement syndrome of left shoulder    Impingement syndrome of right shoulder 03/16/2021    Impingement syndrome of both shoulders    Migraine, unspecified, not intractable, without status migrainosus     Migraines    Other intervertebral disc degeneration, lumbar region 10/23/2020    Lumbar degenerative disc disease    Other obesity due to excess calories 03/22/2022    Class 1 obesity due to excess calories with serious comorbidity and body mass index (BMI) of 30.0 to 30.9 in adult    Pain in left leg 09/18/2020    Pain of left lower extremity    Pain in right hip 11/17/2020    Hip pain, bilateral    Pain in right leg 08/03/2020    Bilateral leg pain    Pain in right leg 10/01/2020    Pain of right lower extremity    Paresthesia of skin 07/21/2020    Paresthesia and pain of both upper extremities    Personal history of other diseases of male genital organs     History of erectile dysfunction    Personal history of other diseases of the circulatory system     History of hypertension    Personal history of other diseases of the digestive system     History of gastroesophageal reflux (GERD)    Personal history of other diseases of the nervous system and sense organs     History of cataract    Personal  history of other specified conditions 03/02/2020    History of chronic cough    Personal history of other specified conditions 07/21/2020    History of impaired glucose tolerance    Personal history of other specified conditions 09/08/2020    History of fatigue    Personal history of other specified conditions 09/18/2020    History of numbness    Polymyalgia rheumatica (Multi) 09/08/2020    Polymyalgia    Radiculopathy, lumbar region 11/17/2020    Lumbar radicular pain    Spinal stenosis, lumbar region with neurogenic claudication 10/23/2020    Lumbar stenosis with neurogenic claudication    Type 2 diabetes mellitus     Unspecified mononeuropathy of bilateral lower limbs 09/08/2020    Neuropathic pain, leg, bilateral    Vision loss     Weakness 12/04/2020    Decreased strength    Wears partial dentures     lower       Surgical History  Past Surgical History:   Procedure Laterality Date    CATARACT EXTRACTION W/  INTRAOCULAR LENS IMPLANT Left     OTHER SURGICAL HISTORY  02/27/2020    Cataract surgery        Social History  He reports that he quit smoking about 50 years ago. His smoking use included cigarettes. He started smoking about 54 years ago. He has a 0.4 pack-year smoking history. He has never used smokeless tobacco. He reports that he does not currently use alcohol after a past usage of about 1.0 standard drink of alcohol per week. He reports that he does not use drugs.    Family History  Family History   Problem Relation Name Age of Onset    Stomach cancer Mother      Stroke Father      Coronary artery disease Father          Allergies  Patient has no known allergies.    Review of Systems   Constitutional:  Negative for chills and fever.   HENT:  Negative for congestion, sore throat and trouble swallowing.    Eyes:  Negative for discharge and redness.   Respiratory:  Negative for cough and shortness of breath.    Cardiovascular:  Negative for chest pain and palpitations.   Gastrointestinal:  Negative for  "abdominal distention, abdominal pain, constipation, diarrhea, nausea and vomiting.   Endocrine: Negative for cold intolerance and heat intolerance.   Genitourinary:  Negative for difficulty urinating, dysuria, frequency and urgency.   Musculoskeletal:  Negative for arthralgias, back pain and neck pain.   Skin:  Negative for color change and rash.   Neurological:  Negative for dizziness, speech difficulty, weakness and light-headedness.        Physical Exam  Constitutional:       General: He is not in acute distress.  HENT:      Head: Normocephalic and atraumatic.   Eyes:      Extraocular Movements: Extraocular movements intact.      Pupils: Pupils are equal, round, and reactive to light.   Cardiovascular:      Rate and Rhythm: Normal rate and regular rhythm.   Pulmonary:      Effort: Pulmonary effort is normal.      Breath sounds: Normal breath sounds.   Abdominal:      General: There is no distension.      Palpations: Abdomen is soft.      Tenderness: There is no abdominal tenderness. There is no guarding or rebound.      Comments: Bilateral inguinal hernias both reducible   Musculoskeletal:         General: Normal range of motion.      Right lower leg: No edema.      Left lower leg: No edema.   Skin:     General: Skin is warm and dry.   Neurological:      General: No focal deficit present.      Mental Status: He is alert and oriented to person, place, and time.          Last Recorded Vitals  Blood pressure 139/76, pulse 61, temperature 36.3 °C (97.4 °F), temperature source Temporal, resp. rate 12, height 1.651 m (5' 5\"), weight 80.3 kg (177 lb), SpO2 100%.      Assessment/Plan   Assessment & Plan      Plan for open repair of bilateral inguinal hernias           Gabo Gaston, DO    "

## 2024-12-12 NOTE — ANESTHESIA PROCEDURE NOTES
Airway  Date/Time: 12/12/2024 10:04 AM  Urgency: elective    Airway not difficult    Staffing  Performed: CRNA   Authorized by: EVANS Bennett    Performed by: EVANS Bennett  Patient location during procedure: OR    Indications and Patient Condition  Indications for airway management: anesthesia and airway protection  Spontaneous ventilation: present  Sedation level: deep  Preoxygenated: yes  Patient position: sniffing  Mask difficulty assessment: 1 - vent by mask    Final Airway Details  Final airway type: endotracheal airway      Successful airway: ETT  Cuffed: yes   Successful intubation technique: direct laryngoscopy  Facilitating devices/methods: intubating stylet  Endotracheal tube insertion site: oral  Blade: Vonnie  Blade size: #4  ETT size (mm): 7.0  Cormack-Lehane Classification: grade I - full view of glottis  Placement verified by: chest auscultation and capnometry   Cuff volume (mL): 5  Measured from: lips  Number of attempts at approach: 3 or more  Ventilation between attempts: BVM  Number of other approaches attempted: 0    Additional Comments  Attempted to pass size 8 OETT with resistance, attempted to pass size 7.5 OETT with resistance, passed size 7 OETT successful.  Dentition intact.

## 2024-12-12 NOTE — OP NOTE
BILATERAL Repair Inguinal Hernia (B) Operative Note     Date: 2024  OR Location: [facility]    Name: Jonathan Watts, : 1950, Age: 74 y.o., MRN: [id number], Sex: male    Diagnosis  Pre-op Diagnosis      * Bilateral inguinal hernia without obstruction or gangrene, recurrence not specified [K40.20] Post-op Diagnosis     * Bilateral inguinal hernia without obstruction or gangrene, recurrence not specified [K40.20]     Procedures  BILATERAL Repair Inguinal Hernia  07781 - PA RPR 1ST INGUN HRNA AGE 5 YRS/> REDUCIBLE      Surgeons      * Osiel Curtis - Primary    Resident/Fellow/Other Assistant:  Surgeons and Role:  * No surgeons found with a matching role *  Juan Gaston DO  Staff:   Circulator: Barbara  Scrub Person: Janell  Scrub Person: Arlette Sánchez Circulator: Alva    Anesthesia Staff: CRNA: SAJI Bennett-CRNA    Procedure Summary  Anesthesia: General  ASA: III  Estimated Blood Loss: 8mL  Intra-op Medications:   Administrations occurring from 1005 to 1305 on 24:   Medication Name Total Dose   BUPivacaine HCl (Marcaine) 0.5 % (5 mg/mL) injection 13 mL   dexAMETHasone (Decadron) injection 4 mg/mL 8 mg   ePHEDrine injection 20 mg   fentaNYL (Sublimaze) injection 50 mcg/mL 200 mcg   lidocaine (cardiac) injection 2% prefilled syringe 60 mg   ondansetron (Zofran) 2 mg/mL injection 4 mg   phenylephrine 100 mcg/mL syringe 10 mL (prefilled) 100 mcg   propofol (Diprivan) injection 10 mg/mL 150 mg   rocuronium (Zemuron) 50 mg/5 mL prefilled syringe 60 mg              Anesthesia Record               Intraprocedure I/O Totals       None           Specimen:   ID Type Source Tests Collected by Time   1 : CORD LIPOMA- RIGHT SIDE INGUINAL HERNIA Tissue LIPOMA SURGICAL PATHOLOGY EXAM Osiel Curtis MD 2024 1242                 Drains and/or Catheters: * None in log *    Tourniquet Times:         Implants:  Implants       Type Name Action Serial No.      Surgical Mesh Sling Implant PATCH, MESH, MARLEX, 6 X 6 IN,  POLYPROPYLENE - [id number] Implanted               Findings:       Indications: Jonathan Watts is an 74 y.o. male who is having surgery for Bilateral inguinal hernia without obstruction or gangrene, recurrence not specified [K40.20].       The patient was seen in the preoperative area. The risks, benefits, complications, treatment options, non-operative alternatives, expected recovery and outcomes were discussed with the patient. The possibilities of reaction to medication, pulmonary aspiration, injury to surrounding structures, bleeding, recurrent infection, the need for additional procedures, failure to diagnose a condition, and creating a complication requiring transfusion or operation were discussed with the patient. The patient concurred with the proposed plan, giving informed consent.  The site of surgery was properly noted/marked if necessary per policy. The patient has been actively warmed in preoperative area. Preoperative antibiotics have been ordered and given within 1 hours of incision. Venous thrombosis prophylaxis have been ordered including bilateral sequential compression devices    Procedure Details: Patient presented with a left inguinal hernia was found to have a right inguinal hernia on exam.  Both were reducible.    Patient was brought to the operating room placed in the supine position placed under general endotracheal anesthesia.  Abdomen was prepped and draped along with groin usual sterile fashion.  After preemptive anesthesia with bupivacaine a left inguinal groin crease incision was made carried down through the subcutaneous tissues to the fascia of the external oblique which was elevated incised and opened down to and including the external ring.  Ilioinguinal nerve was identified and retracted out of harm's way.  Cord structures were elevated the floor was found to be patulous but intact.  External spermatic fascia was opened and cord structures were dissected.  He did have a substantial  herniation of preperitoneal fat through the internal ring.  We could not delineate a specific hernia sac.  Herniated preperitoneal contents were placed back in the preperitoneal space and a floor repair of Bard mesh was performed sutured to the fascia of the pubic tubercle inferiorly the shelving edge of Poupart's ligament laterally and the internal oblique fascia medially with attention to the iliohypogastric nerve.  Suture lines were continued superior to the internal ring.  Ilioinguinal nerve was allowed to come back into the field and then the tails were reapproximated to form a Harsha internal ring calibrated to the cord structures the ilioinguinal nerve and a fingertip for cord swelling.  Tails were placed deep to the external oblique aponeurosis after reapproximation.  Aponeurosis was reapproximated with running Vicryl with attention to the ilioinguinal nerve.  Jamey's fascia was reapproximated with interrupted Vicryl.  Skin was reapproximated with subcuticular Vicryl.  A similar procedure was performed on the right-hand side with major difference being that the herniated preperitoneal fat was of a lesser extent but his floor was a bit weaker without geovanni herniation.  At the conclusion of the procedure testicles were ensured to be in the respective hemiscrotum.  He tolerated the procedure well will be awakened extubated and returned to the recovery room in satisfactory condition.  Complications:  None; patient tolerated the procedure well.    Disposition: PACU - hemodynamically stable.  Condition: stable                 Additional Details:       Attending Attestation: I was present for the entire procedure.    Osiel Curtis  Phone Number: [phone]

## 2024-12-12 NOTE — ANESTHESIA PREPROCEDURE EVALUATION
Patient: Jonathan Watts    Procedure Information       Date/Time: 12/12/24 1005    Procedure: BILATERAL Repair Inguinal Hernia (Bilateral: Groin) - 2.5 HOUR    Location: [facility] OR 02 / Virtual [facility]    Surgeons: Osiel Curtis MD            Relevant Problems   Anesthesia (within normal limits)      Cardiac   (+) Essential hypertension      Neuro   (+) Anxiety, mild   (+) Bilateral carpal tunnel syndrome   (+) Chronic right-sided lumbar radiculopathy      Endocrine   (+) Dyslipidemia associated with type 2 diabetes mellitus (Multi)   (+) Type 2 diabetes mellitus with hyperglycemia, without long-term current use of insulin      Musculoskeletal   (+) Bilateral carpal tunnel syndrome   (+) Generalized osteoarthritis of multiple sites      ID   (+) COVID-19       Clinical information reviewed:   Tobacco  Allergies  Meds  Problems  Med Hx  Surg Hx   Fam Hx  Soc   Hx        NPO Detail:  NPO/Void Status  Date of Last Liquid: 12/12/24  Time of Last Liquid: 0600  Date of Last Solid: 12/11/24  Time of Last Solid: 1800         Physical Exam    Airway  Mallampati: II  TM distance: >3 FB  Neck ROM: full     Cardiovascular - normal exam     Dental - normal exam       Pulmonary - normal exam     Abdominal - normal exam         Anesthesia Plan    History of general anesthesia?: yes  History of complications of general anesthesia?: no    ASA 3     general     The patient is not a current smoker.    intravenous induction   Postoperative administration of opioids is intended.  Anesthetic plan and risks discussed with patient.    Plan discussed with CRNA.

## 2024-12-13 NOTE — ANESTHESIA POSTPROCEDURE EVALUATION
Patient: Jonathan Watts    Procedure Summary       Date: 12/12/24 Room / Location: [facility] 02 / [facility]    Anesthesia Start: 0957 Anesthesia Stop: 1353    Procedure: BILATERAL Repair Inguinal Hernia (Bilateral: Groin) Diagnosis:       Bilateral inguinal hernia without obstruction or gangrene, recurrence not specified      (Bilateral inguinal hernia without obstruction or gangrene, recurrence not specified [K40.20])    Surgeons: Osiel Curtis MD Responsible Provider: EVANS Bennett    Anesthesia Type: general ASA Status: 3            Anesthesia Type: general    Vitals Value Taken Time   /77 12/12/24 1410   Temp 36.8 °C (98.3 °F) 12/12/24 1344   Pulse 73 12/12/24 1418   Resp 12 12/12/24 1418   SpO2 100 % 12/12/24 1418   Vitals shown include unfiled device data.    Anesthesia Post Evaluation    Patient location during evaluation: PACU  Patient participation: complete - patient participated  Level of consciousness: awake  Pain score: 0  Pain management: adequate  Airway patency: patent  Cardiovascular status: acceptable  Respiratory status: acceptable  Hydration status: acceptable  Postoperative Nausea and Vomiting: none    No notable events documented.

## 2024-12-18 LAB
LABORATORY COMMENT REPORT: NORMAL
PATH REPORT.FINAL DX SPEC: NORMAL
PATH REPORT.GROSS SPEC: NORMAL
PATH REPORT.RELEVANT HX SPEC: NORMAL
PATH REPORT.TOTAL CANCER: NORMAL

## 2025-01-03 ENCOUNTER — TELEPHONE (OUTPATIENT)
Dept: PRIMARY CARE | Facility: CLINIC | Age: 75
End: 2025-01-03
Payer: MEDICARE

## 2025-01-03 DIAGNOSIS — F41.9 ANXIETY DISORDER, UNSPECIFIED: ICD-10-CM

## 2025-01-03 DIAGNOSIS — I10 ESSENTIAL HYPERTENSION: ICD-10-CM

## 2025-01-03 DIAGNOSIS — E78.5 DYSLIPIDEMIA, GOAL LDL BELOW 100: ICD-10-CM

## 2025-01-03 DIAGNOSIS — Z23 FLU VACCINE NEED: ICD-10-CM

## 2025-01-03 RX ORDER — ATORVASTATIN CALCIUM 10 MG/1
10 TABLET, FILM COATED ORAL DAILY
Qty: 90 TABLET | Refills: 3 | Status: SHIPPED | OUTPATIENT
Start: 2025-01-03

## 2025-01-03 RX ORDER — SERTRALINE HYDROCHLORIDE 50 MG/1
50 TABLET, FILM COATED ORAL DAILY
Qty: 90 TABLET | Refills: 3 | Status: SHIPPED | OUTPATIENT
Start: 2025-01-03

## 2025-01-03 NOTE — TELEPHONE ENCOUNTER
atorvastatin (Lipitor) 10 mg tablet   sertraline (Zoloft) 50 mg tablet needs a refill on on these please send to [facility] in [place]

## 2025-04-04 ENCOUNTER — APPOINTMENT (OUTPATIENT)
Dept: PRIMARY CARE | Facility: CLINIC | Age: 75
End: 2025-04-04
Payer: MEDICARE

## 2025-04-04 VITALS
SYSTOLIC BLOOD PRESSURE: 110 MMHG | HEIGHT: 65 IN | HEART RATE: 64 BPM | DIASTOLIC BLOOD PRESSURE: 62 MMHG | WEIGHT: 174 LBS | BODY MASS INDEX: 28.99 KG/M2

## 2025-04-04 DIAGNOSIS — E11.69 DYSLIPIDEMIA ASSOCIATED WITH TYPE 2 DIABETES MELLITUS: ICD-10-CM

## 2025-04-04 DIAGNOSIS — T73.3XXA FATIGUE DUE TO EXCESSIVE EXERTION, INITIAL ENCOUNTER: Primary | ICD-10-CM

## 2025-04-04 DIAGNOSIS — E78.5 DYSLIPIDEMIA ASSOCIATED WITH TYPE 2 DIABETES MELLITUS: ICD-10-CM

## 2025-04-04 DIAGNOSIS — Z00.00 MEDICARE ANNUAL WELLNESS VISIT, SUBSEQUENT: ICD-10-CM

## 2025-04-04 DIAGNOSIS — E55.9 VITAMIN D DEFICIENCY: ICD-10-CM

## 2025-04-04 DIAGNOSIS — I10 ESSENTIAL HYPERTENSION: ICD-10-CM

## 2025-04-04 DIAGNOSIS — E11.65 TYPE 2 DIABETES MELLITUS WITH HYPERGLYCEMIA, WITHOUT LONG-TERM CURRENT USE OF INSULIN: ICD-10-CM

## 2025-04-04 DIAGNOSIS — Z00.00 ROUTINE GENERAL MEDICAL EXAMINATION AT HEALTH CARE FACILITY: ICD-10-CM

## 2025-04-04 PROBLEM — R06.02 EXERTIONAL SHORTNESS OF BREATH: Status: ACTIVE | Noted: 2025-04-04

## 2025-04-04 ASSESSMENT — ENCOUNTER SYMPTOMS
OCCASIONAL FEELINGS OF UNSTEADINESS: 0
DEPRESSION: 0
CHILLS: 0
MYALGIAS: 0
FATIGUE: 1
LOSS OF SENSATION IN FEET: 0

## 2025-04-04 ASSESSMENT — PATIENT HEALTH QUESTIONNAIRE - PHQ9
2. FEELING DOWN, DEPRESSED OR HOPELESS: NOT AT ALL
SUM OF ALL RESPONSES TO PHQ9 QUESTIONS 1 AND 2: 0
1. LITTLE INTEREST OR PLEASURE IN DOING THINGS: NOT AT ALL

## 2025-04-04 ASSESSMENT — ANXIETY QUESTIONNAIRES
7. FEELING AFRAID AS IF SOMETHING AWFUL MIGHT HAPPEN: NOT AT ALL
1. FEELING NERVOUS, ANXIOUS, OR ON EDGE: NOT AT ALL
3. WORRYING TOO MUCH ABOUT DIFFERENT THINGS: NOT AT ALL
IF YOU CHECKED OFF ANY PROBLEMS ON THIS QUESTIONNAIRE, HOW DIFFICULT HAVE THESE PROBLEMS MADE IT FOR YOU TO DO YOUR WORK, TAKE CARE OF THINGS AT HOME, OR GET ALONG WITH OTHER PEOPLE: NOT DIFFICULT AT ALL
6. BECOMING EASILY ANNOYED OR IRRITABLE: NOT AT ALL
4. TROUBLE RELAXING: NOT AT ALL
5. BEING SO RESTLESS THAT IT IS HARD TO SIT STILL: NOT AT ALL
2. NOT BEING ABLE TO STOP OR CONTROL WORRYING: NOT AT ALL
GAD7 TOTAL SCORE: 0

## 2025-04-04 ASSESSMENT — ACTIVITIES OF DAILY LIVING (ADL)
DOING_HOUSEWORK: INDEPENDENT
MANAGING_FINANCES: INDEPENDENT
TAKING_MEDICATION: INDEPENDENT
BATHING: INDEPENDENT
GROCERY_SHOPPING: INDEPENDENT
DRESSING: INDEPENDENT

## 2025-04-04 ASSESSMENT — COLUMBIA-SUICIDE SEVERITY RATING SCALE - C-SSRS
6. HAVE YOU EVER DONE ANYTHING, STARTED TO DO ANYTHING, OR PREPARED TO DO ANYTHING TO END YOUR LIFE?: NO
1. IN THE PAST MONTH, HAVE YOU WISHED YOU WERE DEAD OR WISHED YOU COULD GO TO SLEEP AND NOT WAKE UP?: NO
2. HAVE YOU ACTUALLY HAD ANY THOUGHTS OF KILLING YOURSELF?: NO

## 2025-04-04 NOTE — PROGRESS NOTES
"Subjective   Reason for Visit: Jonathan Watts is an 75 y.o. male here for a Medicare Wellness visit.     Past Medical, Surgical, and Family History reviewed and updated in chart.    Reviewed all medications by prescribing practitioner or clinical pharmacist (such as prescriptions, OTCs, herbal therapies and supplements) and documented in the medical record.    Fatigue  This is a chronic problem. The current episode started more than 1 year ago. The problem occurs daily. The problem has been waxing and waning. Associated symptoms include fatigue. Pertinent negatives include no chest pain, chills, congestion or myalgias. The symptoms are aggravated by exertion. He has tried rest and sleep for the symptoms. The treatment provided no relief.       Patient Care Team:  Luis Banegas DO as PCP - General  Luis Banegas DO as PCP - [employer] PCP     Review of Systems   Constitutional:  Positive for fatigue. Negative for chills.   HENT:  Negative for congestion.    Cardiovascular:  Negative for chest pain.   Musculoskeletal:  Negative for myalgias.   All other systems reviewed and are negative.      Objective   Vitals:  /62   Pulse 64   Ht 1.651 m (5' 5\")   Wt 78.9 kg (174 lb)   BMI 28.96 kg/m²       Physical Exam  Vitals and nursing note reviewed.   Constitutional:       General: He is not in acute distress.     Appearance: Normal appearance. He is well-developed. He is not toxic-appearing.   HENT:      Head: Normocephalic and atraumatic.      Right Ear: Tympanic membrane and external ear normal.      Left Ear: Tympanic membrane and external ear normal.      Nose: Nose normal.      Mouth/Throat:      Mouth: Mucous membranes are moist.      Pharynx: Oropharynx is clear. No oropharyngeal exudate or posterior oropharyngeal erythema.      Tonsils: No tonsillar exudate. 2+ on the right. 2+ on the left.   Eyes:      Extraocular Movements: Extraocular movements intact.      Conjunctiva/sclera: " Conjunctivae normal.   Cardiovascular:      Rate and Rhythm: Normal rate and regular rhythm.      Pulses: Normal pulses.      Heart sounds: Normal heart sounds. No murmur heard.  Pulmonary:      Effort: Pulmonary effort is normal.      Breath sounds: Normal breath sounds.   Abdominal:      General: Abdomen is flat. Bowel sounds are normal.      Palpations: Abdomen is soft.   Musculoskeletal:      Cervical back: Neck supple.   Feet:      Right foot:      Skin integrity: Skin integrity normal. No ulcer, blister, skin breakdown, erythema, warmth or callus.      Toenail Condition: Right toenails are normal.      Left foot:      Skin integrity: Skin integrity normal. No ulcer, blister, skin breakdown, erythema, warmth or callus.      Toenail Condition: Left toenails are normal.   Lymphadenopathy:      Cervical: No cervical adenopathy.   Skin:     General: Skin is warm and dry.      Capillary Refill: Capillary refill takes more than 3 seconds.      Findings: No rash.   Neurological:      Mental Status: He is alert. Mental status is at baseline.      Sensory: Sensation is intact.   Psychiatric:         Mood and Affect: Mood normal.         Behavior: Behavior normal.         Thought Content: Thought content normal.         Judgment: Judgment normal.         Assessment & Plan  Essential hypertension  Blood pressure stable on losartan 100 mg daily with chlorthalidone 25 mg daily  Orders:    Follow Up In [facility] - PCP - Medicare Annual    Medicare annual wellness visit, subsequent  Up-to-date with vaccinations and screenings encourage patient to get RSV vaccine discussed advanced medical directives with wife as medical power of        Routine general medical examination at health care facility  Continue with age-based screenings  Orders:    1 Year Follow Up In [facility] - PCP - Wellness Exam; Future    Dyslipidemia associated with type 2 diabetes mellitus  Reevaluate and optimize LDL  cholesterol on atorvastatin 10 mg daily with LDL goal less than 70  Orders:    Comprehensive Metabolic Panel; Future    Hemoglobin A1C; Future    Lipid Panel; Future    Albumin-Creatinine Ratio, Urine Random; Future    Tsh With Reflex To Free T4 If Abnormal; Future    Vitamin B12; Future    Vitamin D 25-Hydroxy,Total (for eval of Vitamin D levels); Future    CBC and Auto Differential; Future    Type 2 diabetes mellitus with hyperglycemia, without long-term current use of insulin  Well-controlled stable with diet control reevaluate fasting blood sugar hemoglobin A1c continue metformin 500 mg twice a day  Orders:    Comprehensive Metabolic Panel; Future    Hemoglobin A1C; Future    Lipid Panel; Future    Albumin-Creatinine Ratio, Urine Random; Future    Tsh With Reflex To Free T4 If Abnormal; Future    Vitamin B12; Future    Vitamin D 25-Hydroxy,Total (for eval of Vitamin D levels); Future    CBC and Auto Differential; Future    Fatigue due to excessive exertion, initial encounter  EKG revealed sinus bradycardia at 50 bpm.  Will evaluate with stress echocardiogram with symptoms of mild exertional dyspnea and easy fatigue to see if this is a chronotropic problem versus silent ischemia not on any rate controlling medications at this time we will also check CBC vitamin B12 and vitamin D levels reevaluate 3 months  Orders:    Tsh With Reflex To Free T4 If Abnormal; Future    Vitamin B12; Future    Vitamin D 25-Hydroxy,Total (for eval of Vitamin D levels); Future    CBC and Auto Differential; Future    ECG 12 lead (Clinic Performed)    Echocardiogram Stress Test; Future    Vitamin D deficiency  Check vitamin D levels  Orders:    Vitamin D 25-Hydroxy,Total (for eval of Vitamin D levels); Future           Advance Directives Discussion  16 - 20 minutes were spent discussing Advanced Care Planning (including a Living Will, Medical Power Of , as well as specific end of life choices and/or directives). The details of  that discussion were documented in Advanced Directives Discussion section of the medical record.     Cardiac Risk Assessment  15 - 20 minutes were spent discussing Cardiovascular risk and, if needed, lifestyle modifications were recommended, including nutritional choices, exercise, and elimination of habits contributing to risk.  ASCVD risk greater than 20%  Aspirin use/disuse was discussed following the guidelines below:  low dose ASA ( mg) should be considered:    If prior Heart Attack/Stroke/Peripheral vascular disease:  Generally recommend daily low dose aspirin unless extremely high bleeding risk (e.g., gastrointestinal).    If no prior Heart Attack/Stroke/Peripheral vascular disease:              Age over 70: Do not use Aspirin for prevention    Age less than 70 and 10-year cardiovascular disease risk is >20%: use low dose Aspirin for prevention.                 Depression Screening  5 - 10 minutes were spent screening for depression.

## 2025-04-04 NOTE — PROGRESS NOTES
"Subjective   Reason for Visit: Jonathan Watts is an 75 y.o. male here for a Medicare Wellness visit.          Reviewed all medications by prescribing practitioner or clinical pharmacist (such as prescriptions, OTCs, herbal therapies and supplements) and documented in the medical record.    HPI    Patient Care Team:  Luis Banegas DO as PCP - General  Luis Banegas DO as PCP - [employer] PCP     Review of Systems    Objective   Vitals:  /62   Pulse 64   Ht 1.651 m (5' 5\")   Wt 78.9 kg (174 lb)   BMI 28.96 kg/m²       Physical Exam    Assessment & Plan  Essential hypertension    Orders:    Follow Up In [facility] - [facility] - Medicare Annual    Non-recurrent bilateral inguinal hernia without obstruction or gangrene    Orders:    Follow Up In [facility] - [facility] - Medicare Annual              "

## 2025-04-04 NOTE — ASSESSMENT & PLAN NOTE
Up-to-date with vaccinations and screenings encourage patient to get RSV vaccine discussed advanced medical directives with wife as medical power of

## 2025-04-04 NOTE — ASSESSMENT & PLAN NOTE
Well-controlled stable with diet control reevaluate fasting blood sugar hemoglobin A1c continue metformin 500 mg twice a day  Orders:    Comprehensive Metabolic Panel; Future    Hemoglobin A1C; Future    Lipid Panel; Future    Albumin-Creatinine Ratio, Urine Random; Future    Tsh With Reflex To Free T4 If Abnormal; Future    Vitamin B12; Future    Vitamin D 25-Hydroxy,Total (for eval of Vitamin D levels); Future    CBC and Auto Differential; Future

## 2025-04-04 NOTE — ASSESSMENT & PLAN NOTE
EKG revealed sinus bradycardia at 50 bpm.  Will evaluate with stress echocardiogram with symptoms of mild exertional dyspnea and easy fatigue to see if this is a chronotropic problem versus silent ischemia not on any rate controlling medications at this time we will also check CBC vitamin B12 and vitamin D levels reevaluate 3 months  Orders:    Tsh With Reflex To Free T4 If Abnormal; Future    Vitamin B12; Future    Vitamin D 25-Hydroxy,Total (for eval of Vitamin D levels); Future    CBC and Auto Differential; Future    ECG 12 lead (Clinic Performed)    Echocardiogram Stress Test; Future

## 2025-04-04 NOTE — ASSESSMENT & PLAN NOTE
Continue with age-based screenings  Orders:    1 Year Follow Up In [facility] - PCP - Wellness Exam; Future

## 2025-04-04 NOTE — ASSESSMENT & PLAN NOTE
Check vitamin D levels  Orders:    Vitamin D 25-Hydroxy,Total (for eval of Vitamin D levels); Future

## 2025-04-04 NOTE — ASSESSMENT & PLAN NOTE
Reevaluate and optimize LDL cholesterol on atorvastatin 10 mg daily with LDL goal less than 70  Orders:    Comprehensive Metabolic Panel; Future    Hemoglobin A1C; Future    Lipid Panel; Future    Albumin-Creatinine Ratio, Urine Random; Future    Tsh With Reflex To Free T4 If Abnormal; Future    Vitamin B12; Future    Vitamin D 25-Hydroxy,Total (for eval of Vitamin D levels); Future    CBC and Auto Differential; Future

## 2025-04-04 NOTE — ASSESSMENT & PLAN NOTE
Blood pressure stable on losartan 100 mg daily with chlorthalidone 25 mg daily  Orders:    Follow Up In [facility] - PCP - Medicare Annual

## 2025-04-18 ENCOUNTER — HOSPITAL ENCOUNTER (OUTPATIENT)
Dept: CARDIOLOGY | Facility: HOSPITAL | Age: 75
Discharge: HOME | End: 2025-04-18
Payer: MEDICARE

## 2025-04-18 DIAGNOSIS — R06.00 DYSPNEA, UNSPECIFIED: ICD-10-CM

## 2025-04-18 DIAGNOSIS — T73.3XXA FATIGUE DUE TO EXCESSIVE EXERTION, INITIAL ENCOUNTER: ICD-10-CM

## 2025-04-18 PROCEDURE — 93017 CV STRESS TEST TRACING ONLY: CPT

## 2025-04-18 PROCEDURE — 93018 CV STRESS TEST I&R ONLY: CPT | Performed by: STUDENT IN AN ORGANIZED HEALTH CARE EDUCATION/TRAINING PROGRAM

## 2025-04-18 PROCEDURE — 93350 STRESS TTE ONLY: CPT | Performed by: STUDENT IN AN ORGANIZED HEALTH CARE EDUCATION/TRAINING PROGRAM

## 2025-04-18 PROCEDURE — 93016 CV STRESS TEST SUPVJ ONLY: CPT | Performed by: STUDENT IN AN ORGANIZED HEALTH CARE EDUCATION/TRAINING PROGRAM

## 2025-04-18 PROCEDURE — 2500000004 HC RX 250 GENERAL PHARMACY W/ HCPCS (ALT 636 FOR OP/ED): Performed by: STUDENT IN AN ORGANIZED HEALTH CARE EDUCATION/TRAINING PROGRAM

## 2025-04-18 RX ADMIN — PERFLUTREN 1.5 ML OF DILUTION: 6.52 INJECTION, SUSPENSION INTRAVENOUS at 09:52

## 2025-04-18 NOTE — SIGNIFICANT EVENT
Jonathan Watts is a 75-year-old male that was referred for a stress echo by Dr. Banegas.  During the stress test, the nurse described the patient as turning gray and he had EKG changes consisted of a left bundle branch block with frequent PVCs that resolved in recovery.  Dr. Cabello reviewed EKG and echo images.  Both the EKG and echo is abnormal and consistent with ischemia.  I discussed these findings with the patient.  He denies any chest pain or shortness of breath.  He states that he is feeling fine.  He said that the main symptom he has had is fatigue and felt like something was wrong.  I told him that we recommend a referral to cardiology as soon as possible and a left heart catheterization to check for any narrowing or blockages in the heart arteries.  He would like Dr. Banegas office notified and he would like to discuss the findings with his wife.  He was given my #[phone] to call when he is ready to schedule.  I reached out to Dr. Banegas office and spoke with So to let them know the abnormal findings our recommendations and our plan for the patient.  I also instructed the patient that if he develops any chest pain that is unrelieved within 5 to 10 minutes he should call 911 or report to his nearest emergency room.  The patient communicated understanding.

## 2025-04-22 ENCOUNTER — TELEPHONE (OUTPATIENT)
Dept: PRIMARY CARE | Facility: CLINIC | Age: 75
End: 2025-04-22
Payer: MEDICARE

## 2025-04-22 NOTE — TELEPHONE ENCOUNTER
----- Message from Luis Banegas sent at 4/21/2025  6:26 PM EDT -----  Reviewed results of stress echocardiogram.  You have and abnormal stress test that needs confirmed by cardiac catheterization with possible potential for a stent in your coronary arteries.  I have   scheduled an appointment with cardiology for further evaluation we will schedule as soon as possible.  If you are having any chest pains or significant shortness of breath with exertion please go to   emergency department for evaluation  ----- Message -----  From: Mario Alberto Ireland - Cardiology Results In  Sent: 4/18/2025   3:28 PM EDT  To: Luis Banegas, DO

## 2025-05-01 ENCOUNTER — OFFICE VISIT (OUTPATIENT)
Dept: CARDIOLOGY | Facility: HOSPITAL | Age: 75
End: 2025-05-01
Payer: MEDICARE

## 2025-05-01 VITALS
WEIGHT: 174 LBS | BODY MASS INDEX: 28.99 KG/M2 | HEIGHT: 65 IN | SYSTOLIC BLOOD PRESSURE: 136 MMHG | DIASTOLIC BLOOD PRESSURE: 84 MMHG | HEART RATE: 60 BPM

## 2025-05-01 DIAGNOSIS — R94.39 ABNORMAL STRESS ECHOCARDIOGRAM: ICD-10-CM

## 2025-05-01 DIAGNOSIS — R94.39 ABNORMAL STRESS TEST: Primary | ICD-10-CM

## 2025-05-01 DIAGNOSIS — I20.89 ATYPICAL ANGINA: ICD-10-CM

## 2025-05-01 DIAGNOSIS — I10 ESSENTIAL HYPERTENSION: ICD-10-CM

## 2025-05-01 DIAGNOSIS — T73.3XXA FATIGUE DUE TO EXCESSIVE EXERTION, INITIAL ENCOUNTER: ICD-10-CM

## 2025-05-01 DIAGNOSIS — E78.2 MIXED HYPERLIPIDEMIA: ICD-10-CM

## 2025-05-01 LAB
ATRIAL RATE: 60 BPM
P AXIS: 48 DEGREES
P OFFSET: 185 MS
P ONSET: 130 MS
PR INTERVAL: 174 MS
Q ONSET: 217 MS
QRS COUNT: 10 BEATS
QRS DURATION: 90 MS
QT INTERVAL: 444 MS
QTC CALCULATION(BAZETT): 444 MS
QTC FREDERICIA: 444 MS
R AXIS: -13 DEGREES
T AXIS: 14 DEGREES
T OFFSET: 439 MS
VENTRICULAR RATE: 60 BPM

## 2025-05-01 PROCEDURE — 93005 ELECTROCARDIOGRAM TRACING: CPT | Performed by: STUDENT IN AN ORGANIZED HEALTH CARE EDUCATION/TRAINING PROGRAM

## 2025-05-01 PROCEDURE — 3079F DIAST BP 80-89 MM HG: CPT | Performed by: STUDENT IN AN ORGANIZED HEALTH CARE EDUCATION/TRAINING PROGRAM

## 2025-05-01 PROCEDURE — 99215 OFFICE O/P EST HI 40 MIN: CPT | Mod: 25 | Performed by: STUDENT IN AN ORGANIZED HEALTH CARE EDUCATION/TRAINING PROGRAM

## 2025-05-01 PROCEDURE — 1159F MED LIST DOCD IN RCRD: CPT | Performed by: STUDENT IN AN ORGANIZED HEALTH CARE EDUCATION/TRAINING PROGRAM

## 2025-05-01 PROCEDURE — 1123F ACP DISCUSS/DSCN MKR DOCD: CPT | Performed by: STUDENT IN AN ORGANIZED HEALTH CARE EDUCATION/TRAINING PROGRAM

## 2025-05-01 PROCEDURE — 93010 ELECTROCARDIOGRAM REPORT: CPT | Performed by: STUDENT IN AN ORGANIZED HEALTH CARE EDUCATION/TRAINING PROGRAM

## 2025-05-01 PROCEDURE — 3075F SYST BP GE 130 - 139MM HG: CPT | Performed by: STUDENT IN AN ORGANIZED HEALTH CARE EDUCATION/TRAINING PROGRAM

## 2025-05-01 PROCEDURE — 99205 OFFICE O/P NEW HI 60 MIN: CPT | Performed by: STUDENT IN AN ORGANIZED HEALTH CARE EDUCATION/TRAINING PROGRAM

## 2025-05-01 NOTE — PROGRESS NOTES
[facility] Clinic Note    Date: 05/01/25  Time: 1:45 PM    Subjective   Jonathan Watts is a 75 y.o. male who is referred to me due to abnormal stress test.  Patient with a past medical history of diabetes mellitus, hypertension, hyperlipidemia, exertional fatigue/exhaustion, dyspnea. Ongoing symptoms for couple of years.  Due to exertional fatigue/exertion patient underwent a stress test which was abnormal.  Patient had EKG changes and new LBBB during the test.  There are regional wall motion abnormalities at the same time.  Patient was referred to me for the same.  Patient reports no resting symptoms but only exertional fatigue/exertion/dyspnea.  Patient has been on aspirin and statin therapy.    Smoking- past smoker, quit 50 yrs ago   Alcohol- social   Illicit drugs-denies   Family history- father had MI at 64 yr age          Review of Systems:  Otherwise, limited cardiovascular review of systems is negative.        Medical History:   He has a past medical history of Abnormal posture (12/04/2020), Anxiety, BPH (benign prostatic hyperplasia), Cataract, Complex regional pain syndrome i of upper limb, bilateral (07/21/2020), Easy bruising, Encounter for screening for malignant neoplasm of prostate (03/16/2021), GERD (gastroesophageal reflux disease), Hearing aid worn, HL (hearing loss), Hyperlipidemia, Hypertension, Impingement syndrome of left shoulder (11/17/2020), Impingement syndrome of right shoulder (03/16/2021), Migraine, unspecified, not intractable, without status migrainosus, Other intervertebral disc degeneration, lumbar region (10/23/2020), Other obesity due to excess calories (03/22/2022), Pain in left leg (09/18/2020), Pain in right hip (11/17/2020), Pain in right leg (08/03/2020), Pain in right leg (10/01/2020), Paresthesia of skin (07/21/2020), Personal history of other diseases of male genital organs, Personal history of other diseases of the circulatory system, Personal  history of other diseases of the digestive system, Personal history of other diseases of the nervous system and sense organs, Personal history of other specified conditions (03/02/2020), Personal history of other specified conditions (07/21/2020), Personal history of other specified conditions (09/08/2020), Personal history of other specified conditions (09/18/2020), Polymyalgia rheumatica (Multi) (09/08/2020), Radiculopathy, lumbar region (11/17/2020), Spinal stenosis, lumbar region with neurogenic claudication (10/23/2020), Type 2 diabetes mellitus, Unspecified mononeuropathy of bilateral lower limbs (09/08/2020), Vision loss, Weakness (12/04/2020), and Wears partial dentures.  Surgical History:   Surgical History[1]PSHP@  Social History:   Social Drivers of Health with Concerns     Tobacco Use: Medium Risk (4/4/2025)    Patient History     Smoking Tobacco Use: Former     Smokeless Tobacco Use: Never     Passive Exposure: Not on file   Alcohol Use: Not on file   Financial Resource Strain: Not on file   Food Insecurity: Not on file   Transportation Needs: Not on file   Physical Activity: Not on file   Stress: Not on file   Social Connections: Not on file   Intimate Partner Violence: Not on file   Housing Stability: Not on file   Utilities: Not on file   Digital Equity: Not on file   Health Literacy: Not on file     Family History:   Family History[2]   Allergies:  Patient has no known allergies.    Outpatient Medications:  Current Outpatient Medications   Medication Instructions    albuterol (Ventolin HFA) 90 mcg/actuation inhaler 2 puffs, inhalation, Every 4 hours PRN    aspirin 81 mg EC tablet 1 tablet, Daily    atorvastatin (LIPITOR) 10 mg, oral, Daily    chlorthalidone (HYGROTON) 25 mg, oral, Daily    losartan (COZAAR) 100 mg, oral, Daily    metFORMIN (GLUCOPHAGE) 500 mg, oral, Take with food.    omeprazole (PRILOSEC) 20 mg, oral, Daily, Take (1) capsule by mouth once daily in the morning before breakfast.     "polyethylene glycol (Glycolax, Miralax) 17 gram packet Mix 17 g (one packet) into 6-8 ounces of liquid and drink by mouth once daily.    sertraline (ZOLOFT) 50 mg, oral, Daily       Objective     Physical Exam  Vitals:    05/01/25 1307   BP: 136/84   BP Location: Left arm   Patient Position: Sitting   BP Cuff Size: Adult   Pulse: 60   Weight: 78.9 kg (174 lb)   Height: 1.651 m (5' 5\")     Wt Readings from Last 3 Encounters:   05/01/25 78.9 kg (174 lb)   04/04/25 78.9 kg (174 lb)   12/12/24 80.3 kg (177 lb)       General: Alert and Oriented, No distress, cooperative  Head: Normocephalic without obvious abnormality, atraumatic  Eyes: Conjunctiva/corneas clear, EOM's grossly intact  Neck: Supple, trachea midline, No thyroid enlargement/tenderness/nodules; No JVD  Lungs: Clear to auscultation bilaterally, no wheezes, rhonci, or rales. respirations unlabored  Chest Wall: No tenderness or deformity  Heart: Regular rhythm, normal S1/S2, no murmur  Abdomen: Soft, non-tender, Non-distended, bowel sounds active  Extremities: No edema, no cyanosis, no clubbing  Skin: Skin color, texture, turgor normal.  No rashes or lesions noted  Neurologic: Alert and oriented x 3, grossly moving all extremities, speech intact        I have personally reviewed the following images and laboratory findings:  ECG: See scanned  Echocardiogram:  Exercise Stress Echo     Patient Name:      CANDIDA FUNEZ          Ordering Provider:     [id number] LUZ MARIA ABBASI  Study Date:        4/18/2025            Reading Physician:     Ángel Cabello MD  MRN/PID:           [id number]             Supervising Physician: Ángel Cabello MD  Accession#:        [id number]         Fellow:  Date of Birth/Age: 1950 / 75 years Fellow:  Gender:            M                "     Nurse:                 Nikia Rueda RN  Admission Status:  Outpatient           Sonographer:           Aicha Steward                                                                 RDJUANITO  Height:            165.00 cm            Technologist:  Weight:            78.90 kg             Additional Staff:  BSA:               1.86 m2              Encounter#:            [id number]  BMI:               28.98 kg/m2          Patient Location:      [facility]     Study Type:    ECHOCARDIOGRAM STRESS TEST  Diagnosis/ICD: Dyspnea, unspecified-R06.00  Indication:    Dyspnea on Exertion  CPT Codes:     Stress Echo-27343; Stress Test Interpretation-27647; Stress Test                 Supervision-[id number]     Falls Risk: Low: Patient has low risk for sustaining a fall; environmental safety interventions in place.     Study Details: Correct procedure and correct patient verified verbally and with                 ID Band checked.        Patient History: Hypertension, dyslipidemia and dyspnea.  Allergies: None.  Diabetes:  Yes.  BMI:       Overweight 25 - 30.        Medications: Albuterol, Aspirin, Lipitor, Hygroton, Cozaar, Glucophage, Prilosec, Miralax, Zoloft. The patient took medications as prescribed.     Patient Performance: The patient exercised to stage II on a Avila protocol for 5 minutes and 25 seconds, achieving 7.0 METS. The peak heart rate achieved was 146 bpm, which was 102 % of the age predicted target heart rate of 143 bpm. The resting blood pressure was 132/73 mmHg with a heart rate of 55 bpm. The standing blood pressure was 152/68 mmHg with a heart rate of 62 bpm. The patient's functional capacity was average. The patient developed shortness of breath and dyspnea during the stress exam. The symptoms resolved with rest. The blood pressure response was normal. The test was terminated due to: fatigue. Patient has met the discharge criteria and is discharged to home.     70% maximum predicted heart rate (MPHR) is 100  bpm.  85% maximum predicted heart rate (MPHR) is 121 bpm.  100% maximum predicted heart rate (MPHR) is 143 bpm.  Peak Oxygen Use:  ml/kg/min.  Double Product (HR x BP): 245.  Exercise Capacity: 70% Achieved HR = 4.6 METS : 85% Achieved HR = 7.0 METS.        Baseline ECG: Resting ECG showed sinus bradycardia with left ventricular hypertrophy.     Stress ECG: Stress ECG showed sinus tachycardia, with frequent premature ventricular contractions. There was a 1.5 mm horizontal ST segment depression in leads II, III and aVF during the peak stress period. Patient developed transient LBBB during exercise that resolved in recovery.     Stress Stage Data:  +-----------------+---+------+-------+----------------+------+--------+                   HR Sys BPDias BPRPE             METS  Comments  +-----------------+---+------+-------+----------------+------+--------+  Baseline Resting 55 132   73                                     +-----------------+---+------+-------+----------------+------+--------+  Baseline [id number] 152   68                                     +-----------------+---+------+-------+----------------+------+--------+  Stage I          [id number]   84     13=Somewhat hard4.6   no CP     +-----------------+---+------+-------+----------------+------+--------+  Stage II         146UTO   UTO    17=Very hard    7 METSno CP     +-----------------+---+------+-------+----------------+------+--------+        Recovery ECG: Recovery ECG showed normal sinus rhythm. The heart rate recovery was normal.     +------------+---+------+-------+              HR Sys BPDias BP  +------------+---+------+-------+  Recovery I  [id number]   84       +------------+---+------+-------+  Recovery III81 184   85       +------------+---+------+-------+  Recovery V  72 155   77       +------------+---+------+-------+  Recovery VI 71 137   69        +------------+---+------+-------+        Baseline Echo: Definity used as a contrast agent for endocardial border definition. Total contrast used for this procedure was 2 mL via IV push. Global LV systolic function is normal. There are no regional wall motion abnormalities at baseline.     Stress Echo: At peak, there are stress-induced regional wall motion abnormalities.     LV Wall Scoring:  Stage:Impost The entire anterior septum, mid and apical inferior septum, and               apex are hypokinetic.           Mitral Valve:  Resting  MV E Vmax:  0.80 m/s  MV A Vmax:  0.73 m/s  MV E/A:     1.11  Lateral e': 0.07 m/s  Medial e':  0.08 m/s  MV DT:      157 msec           Summary:   1. Abnormal Stress Test.   2. Adequate level of stress achieved.   3. Normal global left ventricular systolic function.   4. ECG changes consistent with ischemia. There is ST depression in inferior leads and patient develoed transient LBBB during exercise that resolved in recovery.   5. At peak, there are stress-induced regional wall motion abnormalities.   6. Stage 2: Impost: Entire anterior septum, mid and apical inferior septum, and apex are abnormal.   7. Patient instructed regarding abnormal stress test. Referring provider's office notified of abnormal stress test results.     Laboratory values:   No visits with results within 2 Month(s) from this visit.   Latest known visit with results is:   Admission on 12/12/2024, Discharged on 12/12/2024   Component Date Value    POCT Glucose 12/12/2024 128 (H)     Case Report 12/12/2024                      Value:Surgical Pathology                                Case: [id number]                                  Authorizing Provider:  Osiel Curtis MD            Collected:           12/12/2024 1242              Ordering Location:     [facility]  Received:            12/12/2024 1354                                     OR                                                               "             Pathologist:           Karla Welsh MD                                                             Specimen:    LIPOMA OF SPERMATIC CORD, CORD LIPOMA- RIGHT SIDE INGUINAL HERNIA                          FINAL DIAGNOSIS 12/12/2024                      Value:A.  Cord lipoma-right-sided inguinal hernia:  Lobulated mature adipose tissue, consistent with lipoma.        12/12/2024                      Value:By the signature on this report, the individual or group listed as making the Final Interpretation/Diagnosis certifies that they have reviewed this case.       Clinical History 12/12/2024                      Value:Pre-op diagnosis:  Bilateral inguinal hernia without obstruction or gangrene, recurrence not specified [K40.20]      Gross Description 12/12/2024                      Value:A: Received in formalin, labeled with the patient's name and hospital number and \"cord lipoma-right side\", is a segment of yellow fibroadipose soft tissue measuring 3.0 x 1.7 x 1.5 cm and weighing 3.8 grams.  Serial cross sections reveal a yellow homogeneous cut surface.   Representative sections are submitted in 2 cassettes.   LMP       CBC -  Lab Results   Component Value Date    WBC 8.7 12/05/2024    HGB 14.6 12/05/2024    HCT 43.8 12/05/2024    MCV 88 12/05/2024     12/05/2024       CMP -  Lab Results   Component Value Date    CALCIUM 9.3 12/05/2024    PROT 6.7 09/23/2024    ALBUMIN 4.3 09/23/2024    AST 16 09/23/2024    ALT 8 (L) 09/23/2024    ALKPHOS 92 09/23/2024    BILITOT 0.9 09/23/2024       LIPID PANEL -   Lab Results   Component Value Date    CHOL 121 09/23/2024    HDL 35.8 09/23/2024    CHHDL 3.4 09/23/2024    VLDL 21 09/23/2024    TRIG 106 09/23/2024    NHDL 85 09/23/2024       RENAL FUNCTION PANEL -   Lab Results   Component Value Date    K 3.9 12/05/2024       Lab Results   Component Value Date    HGBA1C 6.4 (H) 09/23/2024        Assessment/Plan   Abnormal stress test  LVH by EKG criteria  Essential " hypertension  Diabetes mellitus type 2  Hyperlipidemia  Angina pectoris equivalent    Plan:  -I discussed regarding invasive evaluation given ongoing symptoms and abnormal stress test.  Patient in agreement.  We will proceed with left heart cath/coronary angiography for evaluation.  -Continue aspirin and statin therapy.  -Continue losartan and chlorthalidone as taking.  -I will check lipid panel and LP(a).  -I will obtain TTE for evaluation of structural abnormalities.  - Check CBC and BMP.    RTC after testing    In addition, the following orders were placed today:  Orders Placed This Encounter   Procedures    Lipoprotein a    Basic metabolic panel    CBC    ECG 12 Lead    Transthoracic Echo (TTE) Complete                 SIGNATURE: Torsten Cabello MD PATIENT NAME: Jonathan Watts   DATE/TIME: May 1, 2025 1:45 PM MRN: [id number]                                  [1]   Past Surgical History:  Procedure Laterality Date    CATARACT EXTRACTION W/  INTRAOCULAR LENS IMPLANT Left     OTHER SURGICAL HISTORY  02/27/2020    Cataract surgery   [2]   Family History  Problem Relation Name Age of Onset    Stomach cancer Mother      Stroke Father      Coronary artery disease Father

## 2025-05-01 NOTE — H&P (VIEW-ONLY)
[facility] Note    Date: 05/01/25  Time: 1:45 PM    Subjective   Jonathan Watts is a 75 y.o. male who is referred to me due to abnormal stress test.  Patient with a past medical history of diabetes mellitus, hypertension, hyperlipidemia, exertional fatigue/exhaustion, dyspnea. Ongoing symptoms for couple of years.  Due to exertional fatigue/exertion patient underwent a stress test which was abnormal.  Patient had EKG changes and new LBBB during the test.  There are regional wall motion abnormalities at the same time.  Patient was referred to me for the same.  Patient reports no resting symptoms but only exertional fatigue/exertion/dyspnea.  Patient has been on aspirin and statin therapy.    Smoking- past smoker, quit 50 yrs ago   Alcohol- social   Illicit drugs-denies   Family history- father had MI at 64 yr age          Review of Systems:  Otherwise, limited cardiovascular review of systems is negative.        Medical History:   He has a past medical history of Abnormal posture (12/04/2020), Anxiety, BPH (benign prostatic hyperplasia), Cataract, Complex regional pain syndrome i of upper limb, bilateral (07/21/2020), Easy bruising, Encounter for screening for malignant neoplasm of prostate (03/16/2021), GERD (gastroesophageal reflux disease), Hearing aid worn, HL (hearing loss), Hyperlipidemia, Hypertension, Impingement syndrome of left shoulder (11/17/2020), Impingement syndrome of right shoulder (03/16/2021), Migraine, unspecified, not intractable, without status migrainosus, Other intervertebral disc degeneration, lumbar region (10/23/2020), Other obesity due to excess calories (03/22/2022), Pain in left leg (09/18/2020), Pain in right hip (11/17/2020), Pain in right leg (08/03/2020), Pain in right leg (10/01/2020), Paresthesia of skin (07/21/2020), Personal history of other diseases of male genital organs, Personal history of other diseases of the circulatory system, Personal  history of other diseases of the digestive system, Personal history of other diseases of the nervous system and sense organs, Personal history of other specified conditions (03/02/2020), Personal history of other specified conditions (07/21/2020), Personal history of other specified conditions (09/08/2020), Personal history of other specified conditions (09/18/2020), Polymyalgia rheumatica (Multi) (09/08/2020), Radiculopathy, lumbar region (11/17/2020), Spinal stenosis, lumbar region with neurogenic claudication (10/23/2020), Type 2 diabetes mellitus, Unspecified mononeuropathy of bilateral lower limbs (09/08/2020), Vision loss, Weakness (12/04/2020), and Wears partial dentures.  Surgical History:   Surgical History[1]PSHP@  Social History:   Social Drivers of Health with Concerns     Tobacco Use: Medium Risk (4/4/2025)    Patient History     Smoking Tobacco Use: Former     Smokeless Tobacco Use: Never     Passive Exposure: Not on file   Alcohol Use: Not on file   Financial Resource Strain: Not on file   Food Insecurity: Not on file   Transportation Needs: Not on file   Physical Activity: Not on file   Stress: Not on file   Social Connections: Not on file   Intimate Partner Violence: Not on file   Housing Stability: Not on file   Utilities: Not on file   Digital Equity: Not on file   Health Literacy: Not on file     Family History:   Family History[2]   Allergies:  Patient has no known allergies.    Outpatient Medications:  Current Outpatient Medications   Medication Instructions    albuterol (Ventolin HFA) 90 mcg/actuation inhaler 2 puffs, inhalation, Every 4 hours PRN    aspirin 81 mg EC tablet 1 tablet, Daily    atorvastatin (LIPITOR) 10 mg, oral, Daily    chlorthalidone (HYGROTON) 25 mg, oral, Daily    losartan (COZAAR) 100 mg, oral, Daily    metFORMIN (GLUCOPHAGE) 500 mg, oral, Take with food.    omeprazole (PRILOSEC) 20 mg, oral, Daily, Take (1) capsule by mouth once daily in the morning before breakfast.     "polyethylene glycol (Glycolax, Miralax) 17 gram packet Mix 17 g (one packet) into 6-8 ounces of liquid and drink by mouth once daily.    sertraline (ZOLOFT) 50 mg, oral, Daily       Objective     Physical Exam  Vitals:    05/01/25 1307   BP: 136/84   BP Location: Left arm   Patient Position: Sitting   BP Cuff Size: Adult   Pulse: 60   Weight: 78.9 kg (174 lb)   Height: 1.651 m (5' 5\")     Wt Readings from Last 3 Encounters:   05/01/25 78.9 kg (174 lb)   04/04/25 78.9 kg (174 lb)   12/12/24 80.3 kg (177 lb)       General: Alert and Oriented, No distress, cooperative  Head: Normocephalic without obvious abnormality, atraumatic  Eyes: Conjunctiva/corneas clear, EOM's grossly intact  Neck: Supple, trachea midline, No thyroid enlargement/tenderness/nodules; No JVD  Lungs: Clear to auscultation bilaterally, no wheezes, rhonci, or rales. respirations unlabored  Chest Wall: No tenderness or deformity  Heart: Regular rhythm, normal S1/S2, no murmur  Abdomen: Soft, non-tender, Non-distended, bowel sounds active  Extremities: No edema, no cyanosis, no clubbing  Skin: Skin color, texture, turgor normal.  No rashes or lesions noted  Neurologic: Alert and oriented x 3, grossly moving all extremities, speech intact        I have personally reviewed the following images and laboratory findings:  ECG: See scanned  Echocardiogram:  Exercise Stress Echo     Patient Name:      CANDIDA FUNEZ          Ordering Provider:     [id number] LUZ MARIA ABBASI  Study Date:        4/18/2025            Reading Physician:     Ángel Cabello MD  MRN/PID:           [id number]             Supervising Physician: Ángel Cabello MD  Accession#:        [id number]         Fellow:  Date of Birth/Age: 1950 / 75 years Fellow:  Gender:            M                "     Nurse:                 Nikia Rueda RN  Admission Status:  Outpatient           Sonographer:           Aicha Steward                                                                 RDJUANITO  Height:            165.00 cm            Technologist:  Weight:            78.90 kg             Additional Staff:  BSA:               1.86 m2              Encounter#:            [id number]  BMI:               28.98 kg/m2          Patient Location[facility]      [facility]     Study Type:    ECHOCARDIOGRAM STRESS TEST  Diagnosis/ICD: Dyspnea, unspecified-R06.00  Indication:    Dyspnea on Exertion  CPT Codes:     Stress Echo-24876; Stress Test Interpretation-92015; Stress Test                 Supervision-[id number]     Falls Risk: Low: Patient has low risk for sustaining a fall; environmental safety interventions in place.     Study Details: Correct procedure and correct patient verified verbally and with                 ID Band checked.        Patient History: Hypertension, dyslipidemia and dyspnea.  Allergies: None.  Diabetes:  Yes.  BMI:       Overweight 25 - 30.        Medications: Albuterol, Aspirin, Lipitor, Hygroton, Cozaar, Glucophage, Prilosec, Miralax, Zoloft. The patient took medications as prescribed.     Patient Performance: The patient exercised to stage II on a Avila protocol for 5 minutes and 25 seconds, achieving 7.0 METS. The peak heart rate achieved was 146 bpm, which was 102 % of the age predicted target heart rate of 143 bpm. The resting blood pressure was 132/73 mmHg with a heart rate of 55 bpm. The standing blood pressure was 152/68 mmHg with a heart rate of 62 bpm. The patient's functional capacity was average. The patient developed shortness of breath and dyspnea during the stress exam. The symptoms resolved with rest. The blood pressure response was normal. The test was terminated due to: fatigue. Patient has met the discharge criteria and is discharged to home.     70% maximum predicted heart rate (MPHR) is 100  bpm.  85% maximum predicted heart rate (MPHR) is 121 bpm.  100% maximum predicted heart rate (MPHR) is 143 bpm.  Peak Oxygen Use:  ml/kg/min.  Double Product (HR x BP): 245.  Exercise Capacity: 70% Achieved HR = 4.6 METS : 85% Achieved HR = 7.0 METS.        Baseline ECG: Resting ECG showed sinus bradycardia with left ventricular hypertrophy.     Stress ECG: Stress ECG showed sinus tachycardia, with frequent premature ventricular contractions. There was a 1.5 mm horizontal ST segment depression in leads II, III and aVF during the peak stress period. Patient developed transient LBBB during exercise that resolved in recovery.     Stress Stage Data:  +-----------------+---+------+-------+----------------+------+--------+                   HR Sys BPDias BPRPE             METS  Comments  +-----------------+---+------+-------+----------------+------+--------+  Baseline Resting 55 132   73                                     +-----------------+---+------+-------+----------------+------+--------+  Baseline [id number] 152   68                                     +-----------------+---+------+-------+----------------+------+--------+  Stage I          [id number]   84     13=Somewhat hard4.6   no CP     +-----------------+---+------+-------+----------------+------+--------+  Stage II         146UTO   UTO    17=Very hard    7 METSno CP     +-----------------+---+------+-------+----------------+------+--------+        Recovery ECG: Recovery ECG showed normal sinus rhythm. The heart rate recovery was normal.     +------------+---+------+-------+              HR Sys BPDias BP  +------------+---+------+-------+  Recovery I  [id number]   84       +------------+---+------+-------+  Recovery III81 184   85       +------------+---+------+-------+  Recovery V  72 155   77       +------------+---+------+-------+  Recovery VI 71 137   69        +------------+---+------+-------+        Baseline Echo: Definity used as a contrast agent for endocardial border definition. Total contrast used for this procedure was 2 mL via IV push. Global LV systolic function is normal. There are no regional wall motion abnormalities at baseline.     Stress Echo: At peak, there are stress-induced regional wall motion abnormalities.     LV Wall Scoring:  Stage:Impost The entire anterior septum, mid and apical inferior septum, and               apex are hypokinetic.           Mitral Valve:  Resting  MV E Vmax:  0.80 m/s  MV A Vmax:  0.73 m/s  MV E/A:     1.11  Lateral e': 0.07 m/s  Medial e':  0.08 m/s  MV DT:      157 msec           Summary:   1. Abnormal Stress Test.   2. Adequate level of stress achieved.   3. Normal global left ventricular systolic function.   4. ECG changes consistent with ischemia. There is ST depression in inferior leads and patient develoed transient LBBB during exercise that resolved in recovery.   5. At peak, there are stress-induced regional wall motion abnormalities.   6. Stage 2: Impost: Entire anterior septum, mid and apical inferior septum, and apex are abnormal.   7. Patient instructed regarding abnormal stress test. Referring provider's office notified of abnormal stress test results.     Laboratory values:   No visits with results within 2 Month(s) from this visit.   Latest known visit with results is:   Admission on 12/12/2024, Discharged on 12/12/2024   Component Date Value    POCT Glucose 12/12/2024 128 (H)     Case Report 12/12/2024                      Value:Surgical Pathology                                Case: [id number]                                  Authorizing Provider:  Osiel Curtis MD            Collected:           12/12/2024 1242              Ordering Location:     [facility]  Received:            12/12/2024 1354                                     OR                                                               "             Pathologist:           Karla Welsh MD                                                             Specimen:    LIPOMA OF SPERMATIC CORD, CORD LIPOMA- RIGHT SIDE INGUINAL HERNIA                          FINAL DIAGNOSIS 12/12/2024                      Value:A.  Cord lipoma-right-sided inguinal hernia:  Lobulated mature adipose tissue, consistent with lipoma.        12/12/2024                      Value:By the signature on this report, the individual or group listed as making the Final Interpretation/Diagnosis certifies that they have reviewed this case.       Clinical History 12/12/2024                      Value:Pre-op diagnosis:  Bilateral inguinal hernia without obstruction or gangrene, recurrence not specified [K40.20]      Gross Description 12/12/2024                      Value:A: Received in formalin, labeled with the patient's name and hospital number and \"cord lipoma-right side\", is a segment of yellow fibroadipose soft tissue measuring 3.0 x 1.7 x 1.5 cm and weighing 3.8 grams.  Serial cross sections reveal a yellow homogeneous cut surface.   Representative sections are submitted in 2 cassettes.   LMP       CBC -  Lab Results   Component Value Date    WBC 8.7 12/05/2024    HGB 14.6 12/05/2024    HCT 43.8 12/05/2024    MCV 88 12/05/2024     12/05/2024       CMP -  Lab Results   Component Value Date    CALCIUM 9.3 12/05/2024    PROT 6.7 09/23/2024    ALBUMIN 4.3 09/23/2024    AST 16 09/23/2024    ALT 8 (L) 09/23/2024    ALKPHOS 92 09/23/2024    BILITOT 0.9 09/23/2024       LIPID PANEL -   Lab Results   Component Value Date    CHOL 121 09/23/2024    HDL 35.8 09/23/2024    CHHDL 3.4 09/23/2024    VLDL 21 09/23/2024    TRIG 106 09/23/2024    NHDL 85 09/23/2024       RENAL FUNCTION PANEL -   Lab Results   Component Value Date    K 3.9 12/05/2024       Lab Results   Component Value Date    HGBA1C 6.4 (H) 09/23/2024        Assessment/Plan   Abnormal stress test  LVH by EKG criteria  Essential " hypertension  Diabetes mellitus type 2  Hyperlipidemia  Angina pectoris equivalent    Plan:  -I discussed regarding invasive evaluation given ongoing symptoms and abnormal stress test.  Patient in agreement.  We will proceed with left heart cath/coronary angiography for evaluation.  -Continue aspirin and statin therapy.  -Continue losartan and chlorthalidone as taking.  -I will check lipid panel and LP(a).  -I will obtain TTE for evaluation of structural abnormalities.  - Check CBC and BMP.    RTC after testing    In addition, the following orders were placed today:  Orders Placed This Encounter   Procedures    Lipoprotein a    Basic metabolic panel    CBC    ECG 12 Lead    Transthoracic Echo (TTE) Complete                 SIGNATURE: Torsten Cabello MD PATIENT NAME: Jonathan Watts   DATE/TIME: May 1, 2025 1:45 PM MRN: [id number]                                  [1]   Past Surgical History:  Procedure Laterality Date    CATARACT EXTRACTION W/  INTRAOCULAR LENS IMPLANT Left     OTHER SURGICAL HISTORY  02/27/2020    Cataract surgery   [2]   Family History  Problem Relation Name Age of Onset    Stomach cancer Mother      Stroke Father      Coronary artery disease Father

## 2025-05-02 ENCOUNTER — TELEPHONE (OUTPATIENT)
Dept: CARDIOLOGY | Facility: HOSPITAL | Age: 75
End: 2025-05-02
Payer: MEDICARE

## 2025-05-02 PROBLEM — R94.39 ABNORMAL STRESS TEST: Status: ACTIVE | Noted: 2025-05-01

## 2025-05-02 PROBLEM — I20.89 ATYPICAL ANGINA: Status: ACTIVE | Noted: 2025-05-01

## 2025-05-02 NOTE — TELEPHONE ENCOUNTER
Called pt and relayed to him the date and time of his TTE and Cath.    Pt is scheduled for 5/27/25    Arrive @730am (TTE)  Cath Starts @830am    Pt needs labs and instructions and was agreeable to the date and time of testing an procedure.    Thank you!  Heaven CLAROS

## 2025-05-08 ENCOUNTER — TELEPHONE (OUTPATIENT)
Dept: CARDIOLOGY | Facility: HOSPITAL | Age: 75
End: 2025-05-08
Payer: MEDICARE

## 2025-05-08 NOTE — TELEPHONE ENCOUNTER
5/12/25  1112  This is a plan of care note for a 75 year old male patient with a history of DM, HTN, angina who was referred for a heart catheterization due to an abnormal stress echocardiogram which showed EKG changes to the inferior leads and abnormal wall motion abnormalities.    Patient has NKA  Patient has a stable serum creatinine  Patient has recent EKG, but needs labs which were ordered; nurse instructed patient to have labs done 5-7 days before procedure.  Patient does take metformin.  Patient does not take OAC.    Called to give pre procedure instructions for left heart cath to include  Date of procedure, show time of procedure and procedure time.  NPO after midnight x for asa.  To hold metformin 2 days before procedure and 2 days after procedure.  To have a ride due to sedation.  To shower the night before and wear loose comfortable clothes.  To bring an overnight bag in case of overnight stay  To bring an ID card, insurance card and list of medications.    Patient verbalized understanding of instructions and denied any questions at this time.      5/9/25  1639  Called number;  patient not home. Will try again on 5/12/25.    5/8/25  1531  Called patient; no answer. Left voice message for patient to return call for heart cath instructions.

## 2025-05-25 LAB
ANION GAP SERPL CALCULATED.4IONS-SCNC: 9 MMOL/L (CALC) (ref 7–17)
BUN SERPL-MCNC: 16 MG/DL (ref 7–25)
BUN/CREAT SERPL: ABNORMAL (CALC) (ref 6–22)
CALCIUM SERPL-MCNC: 9.3 MG/DL (ref 8.6–10.3)
CHLORIDE SERPL-SCNC: 104 MMOL/L (ref 98–110)
CO2 SERPL-SCNC: 28 MMOL/L (ref 20–32)
CREAT SERPL-MCNC: 1.06 MG/DL (ref 0.7–1.28)
EGFRCR SERPLBLD CKD-EPI 2021: 72 ML/MIN/1.73M2
ERYTHROCYTE [DISTWIDTH] IN BLOOD BY AUTOMATED COUNT: 14.3 % (ref 11–15)
GLUCOSE SERPL-MCNC: 129 MG/DL (ref 65–99)
HCT VFR BLD AUTO: 44.6 % (ref 38.5–50)
HGB BLD-MCNC: 14.8 G/DL (ref 13.2–17.1)
LPA SERPL-SCNC: 137 NMOL/L
MCH RBC QN AUTO: 29.8 PG (ref 27–33)
MCHC RBC AUTO-ENTMCNC: 33.2 G/DL (ref 32–36)
MCV RBC AUTO: 89.9 FL (ref 80–100)
PLATELET # BLD AUTO: 251 THOUSAND/UL (ref 140–400)
PMV BLD REES-ECKER: 9.6 FL (ref 7.5–12.5)
POTASSIUM SERPL-SCNC: 4.6 MMOL/L (ref 3.5–5.3)
RBC # BLD AUTO: 4.96 MILLION/UL (ref 4.2–5.8)
SODIUM SERPL-SCNC: 141 MMOL/L (ref 135–146)
WBC # BLD AUTO: 6.5 THOUSAND/UL (ref 3.8–10.8)

## 2025-05-27 ENCOUNTER — HOSPITAL ENCOUNTER (OUTPATIENT)
Dept: CARDIOLOGY | Facility: HOSPITAL | Age: 75
Discharge: HOME | End: 2025-05-27
Payer: MEDICARE

## 2025-05-27 ENCOUNTER — HOSPITAL ENCOUNTER (OUTPATIENT)
Facility: HOSPITAL | Age: 75
Setting detail: OUTPATIENT SURGERY
Discharge: HOME | End: 2025-05-27
Attending: STUDENT IN AN ORGANIZED HEALTH CARE EDUCATION/TRAINING PROGRAM | Admitting: STUDENT IN AN ORGANIZED HEALTH CARE EDUCATION/TRAINING PROGRAM
Payer: MEDICARE

## 2025-05-27 ENCOUNTER — TELEPHONE (OUTPATIENT)
Dept: CARDIOLOGY | Facility: HOSPITAL | Age: 75
End: 2025-05-27
Payer: MEDICARE

## 2025-05-27 ENCOUNTER — PHARMACY VISIT (OUTPATIENT)
Dept: PHARMACY | Facility: CLINIC | Age: 75
End: 2025-05-27
Payer: COMMERCIAL

## 2025-05-27 VITALS
OXYGEN SATURATION: 100 % | WEIGHT: 174 LBS | DIASTOLIC BLOOD PRESSURE: 68 MMHG | HEIGHT: 65 IN | HEART RATE: 55 BPM | TEMPERATURE: 97.2 F | SYSTOLIC BLOOD PRESSURE: 136 MMHG | BODY MASS INDEX: 28.99 KG/M2 | RESPIRATION RATE: 12 BRPM

## 2025-05-27 DIAGNOSIS — I20.9 ANGINA PECTORIS, UNSPECIFIED: ICD-10-CM

## 2025-05-27 DIAGNOSIS — R94.30 ABNORMAL RESULT OF CARDIOVASCULAR FUNCTION STUDY, UNSPECIFIED: ICD-10-CM

## 2025-05-27 DIAGNOSIS — I20.89 ATYPICAL ANGINA: ICD-10-CM

## 2025-05-27 DIAGNOSIS — R94.39 ABNORMAL STRESS TEST: Primary | ICD-10-CM

## 2025-05-27 DIAGNOSIS — I25.85 CHRONIC CORONARY MICROVASCULAR DYSFUNCTION: ICD-10-CM

## 2025-05-27 DIAGNOSIS — R94.39 ABNORMAL STRESS ECHOCARDIOGRAM: ICD-10-CM

## 2025-05-27 PROCEDURE — 96373 THER/PROPH/DIAG INJ IA: CPT | Performed by: STUDENT IN AN ORGANIZED HEALTH CARE EDUCATION/TRAINING PROGRAM

## 2025-05-27 PROCEDURE — 93458 L HRT ARTERY/VENTRICLE ANGIO: CPT | Performed by: STUDENT IN AN ORGANIZED HEALTH CARE EDUCATION/TRAINING PROGRAM

## 2025-05-27 PROCEDURE — 99152 MOD SED SAME PHYS/QHP 5/>YRS: CPT | Performed by: STUDENT IN AN ORGANIZED HEALTH CARE EDUCATION/TRAINING PROGRAM

## 2025-05-27 PROCEDURE — C1760 CLOSURE DEV, VASC: HCPCS | Performed by: STUDENT IN AN ORGANIZED HEALTH CARE EDUCATION/TRAINING PROGRAM

## 2025-05-27 PROCEDURE — RXMED WILLOW AMBULATORY MEDICATION CHARGE

## 2025-05-27 PROCEDURE — 93356 MYOCRD STRAIN IMG SPCKL TRCK: CPT

## 2025-05-27 PROCEDURE — 93306 TTE W/DOPPLER COMPLETE: CPT | Performed by: INTERNAL MEDICINE

## 2025-05-27 PROCEDURE — 2500000004 HC RX 250 GENERAL PHARMACY W/ HCPCS (ALT 636 FOR OP/ED): Mod: JZ | Performed by: NURSE PRACTITIONER

## 2025-05-27 PROCEDURE — 7100000010 HC PHASE TWO TIME - EACH INCREMENTAL 1 MINUTE: Performed by: STUDENT IN AN ORGANIZED HEALTH CARE EDUCATION/TRAINING PROGRAM

## 2025-05-27 PROCEDURE — 2720000007 HC OR 272 NO HCPCS: Performed by: STUDENT IN AN ORGANIZED HEALTH CARE EDUCATION/TRAINING PROGRAM

## 2025-05-27 PROCEDURE — 2500000001 HC RX 250 WO HCPCS SELF ADMINISTERED DRUGS (ALT 637 FOR MEDICARE OP): Performed by: STUDENT IN AN ORGANIZED HEALTH CARE EDUCATION/TRAINING PROGRAM

## 2025-05-27 PROCEDURE — C1887 CATHETER, GUIDING: HCPCS | Performed by: STUDENT IN AN ORGANIZED HEALTH CARE EDUCATION/TRAINING PROGRAM

## 2025-05-27 PROCEDURE — 2550000001 HC RX 255 CONTRASTS: Performed by: STUDENT IN AN ORGANIZED HEALTH CARE EDUCATION/TRAINING PROGRAM

## 2025-05-27 PROCEDURE — 93356 MYOCRD STRAIN IMG SPCKL TRCK: CPT | Performed by: INTERNAL MEDICINE

## 2025-05-27 PROCEDURE — C1894 INTRO/SHEATH, NON-LASER: HCPCS | Performed by: STUDENT IN AN ORGANIZED HEALTH CARE EDUCATION/TRAINING PROGRAM

## 2025-05-27 PROCEDURE — 2500000004 HC RX 250 GENERAL PHARMACY W/ HCPCS (ALT 636 FOR OP/ED): Mod: JZ | Performed by: STUDENT IN AN ORGANIZED HEALTH CARE EDUCATION/TRAINING PROGRAM

## 2025-05-27 PROCEDURE — C1769 GUIDE WIRE: HCPCS | Performed by: STUDENT IN AN ORGANIZED HEALTH CARE EDUCATION/TRAINING PROGRAM

## 2025-05-27 PROCEDURE — 7100000009 HC PHASE TWO TIME - INITIAL BASE CHARGE: Performed by: STUDENT IN AN ORGANIZED HEALTH CARE EDUCATION/TRAINING PROGRAM

## 2025-05-27 RX ORDER — SODIUM CHLORIDE 9 MG/ML
1000 INJECTION, SOLUTION INTRAVENOUS ONCE AS NEEDED
Status: CANCELLED | OUTPATIENT
Start: 2025-05-27 | End: 2025-05-27

## 2025-05-27 RX ORDER — ISOSORBIDE MONONITRATE 30 MG/1
30 TABLET, EXTENDED RELEASE ORAL DAILY
Qty: 30 TABLET | Refills: 2 | Status: SHIPPED | OUTPATIENT
Start: 2025-05-27 | End: 2025-06-02 | Stop reason: SDUPTHER

## 2025-05-27 RX ORDER — LIDOCAINE HYDROCHLORIDE 20 MG/ML
INJECTION, SOLUTION INFILTRATION; PERINEURAL AS NEEDED
Status: DISCONTINUED | OUTPATIENT
Start: 2025-05-27 | End: 2025-05-27 | Stop reason: HOSPADM

## 2025-05-27 RX ORDER — ACETAMINOPHEN 325 MG/1
650 TABLET ORAL EVERY 6 HOURS PRN
Status: CANCELLED | OUTPATIENT
Start: 2025-05-27

## 2025-05-27 RX ORDER — MIDAZOLAM HYDROCHLORIDE 1 MG/ML
INJECTION, SOLUTION INTRAMUSCULAR; INTRAVENOUS AS NEEDED
Status: DISCONTINUED | OUTPATIENT
Start: 2025-05-27 | End: 2025-05-27 | Stop reason: HOSPADM

## 2025-05-27 RX ORDER — ACETAMINOPHEN 650 MG/1
650 SUPPOSITORY RECTAL EVERY 6 HOURS PRN
Status: CANCELLED | OUTPATIENT
Start: 2025-05-27

## 2025-05-27 RX ORDER — ACETAMINOPHEN 160 MG/5ML
650 SOLUTION ORAL EVERY 6 HOURS PRN
Status: CANCELLED | OUTPATIENT
Start: 2025-05-27

## 2025-05-27 RX ORDER — FENTANYL CITRATE 50 UG/ML
INJECTION, SOLUTION INTRAMUSCULAR; INTRAVENOUS AS NEEDED
Status: DISCONTINUED | OUTPATIENT
Start: 2025-05-27 | End: 2025-05-27 | Stop reason: HOSPADM

## 2025-05-27 RX ORDER — MORPHINE SULFATE 2 MG/ML
2 INJECTION, SOLUTION INTRAMUSCULAR; INTRAVENOUS EVERY 6 HOURS PRN
Refills: 0 | Status: CANCELLED | OUTPATIENT
Start: 2025-05-27

## 2025-05-27 RX ORDER — SODIUM CHLORIDE 9 MG/ML
75 INJECTION, SOLUTION INTRAVENOUS CONTINUOUS
Status: CANCELLED | OUTPATIENT
Start: 2025-05-27 | End: 2025-05-27

## 2025-05-27 RX ORDER — NAPROXEN SODIUM 220 MG/1
81 TABLET, FILM COATED ORAL ONCE
Status: COMPLETED | OUTPATIENT
Start: 2025-05-27 | End: 2025-05-27

## 2025-05-27 RX ORDER — HEPARIN SODIUM 1000 [USP'U]/ML
INJECTION, SOLUTION INTRAVENOUS; SUBCUTANEOUS AS NEEDED
Status: DISCONTINUED | OUTPATIENT
Start: 2025-05-27 | End: 2025-05-27 | Stop reason: HOSPADM

## 2025-05-27 RX ORDER — SODIUM CHLORIDE 9 MG/ML
100 INJECTION, SOLUTION INTRAVENOUS CONTINUOUS
Status: ACTIVE | OUTPATIENT
Start: 2025-05-27 | End: 2025-05-27

## 2025-05-27 RX ADMIN — ASPIRIN 81 MG 81 MG: 81 TABLET ORAL at 08:25

## 2025-05-27 RX ADMIN — SODIUM CHLORIDE 100 ML/HR: 9 INJECTION, SOLUTION INTRAVENOUS at 08:47

## 2025-05-27 ASSESSMENT — PAIN SCALES - GENERAL

## 2025-05-27 ASSESSMENT — COLUMBIA-SUICIDE SEVERITY RATING SCALE - C-SSRS
2. HAVE YOU ACTUALLY HAD ANY THOUGHTS OF KILLING YOURSELF?: NO
1. IN THE PAST MONTH, HAVE YOU WISHED YOU WERE DEAD OR WISHED YOU COULD GO TO SLEEP AND NOT WAKE UP?: NO
6. HAVE YOU EVER DONE ANYTHING, STARTED TO DO ANYTHING, OR PREPARED TO DO ANYTHING TO END YOUR LIFE?: NO

## 2025-05-27 ASSESSMENT — PAIN - FUNCTIONAL ASSESSMENT: PAIN_FUNCTIONAL_ASSESSMENT: 0-10

## 2025-05-27 NOTE — POST-PROCEDURE NOTE
Physician Transition of Care Summary  Invasive Cardiovascular Lab    Procedure Date: 5/27/2025  Attending:    Conor Cabello - Primary  Resident/Fellow/Other Assistant: Surgeons and Role:  * No surgeons found with a matching role *    Indications:   Pre-op Diagnosis      * Abnormal stress test [R94.39]     * Atypical angina [I20.89]    Post-procedure diagnosis:   Post-op Diagnosis     * Abnormal stress test [R94.39]     * Atypical angina [I20.89]    Procedure(s):   [facility], With LV  [address] - SD CATH PLMT L HRT & ARTS W/NJX & ANGIO IMG S&I        Procedure Findings:   Small branch of the Ramus occluded   Patent LAD/LCX/RCA   Microvascular disease     Description of the Procedure:   LHC  Rt radial>TR band     Complications:   None     Stents/Implants:   Implants       No implant documentation for this case.            Anticoagulation/Antiplatelet Plan:   Aspirin 81 mg daily     Estimated Blood Loss:   5 mL    Anesthesia: Moderate Sedation Anesthesia Staff: No anesthesia staff entered.    Any Specimen(s) Removed:   No specimens collected during this procedure.    Disposition:   Recovery and home     Recs:   Refer to Dr. Gan for microvascular dysfunction   Add PO Imdur 30 mg daily       Electronically signed by: SAJI Briceno-CNP, 5/27/2025 9:54 AM

## 2025-05-27 NOTE — PRE-SEDATION DOCUMENTATION
"Cardiology Preprocedure Note    Jonathan Watts   Indication for procedure: The primary encounter diagnosis was Abnormal stress test. A diagnosis of Atypical angina was also pertinent to this visit. Patient here for [facility] r/o obstructive CAD. Patient underwent stress testing on 4/18/25 There was a 1.5 mm horizontal ST segment depression in leads II, III and aVF during the peak stress period. Patient developed transient LBBB during exercise that resolved in recovery. At peak, there are stress-induced regional wall motion abnormalities. Pre labs only abnormal for elevated glucose at 129 and lipoprotein (a) elevated at 137. CBC within normal limits. He was last seen and evaluated by Dr. Cabello on 5/1/25. His last lipid was from 9/2024. He denies any chest pain, shortness of breath, lightheadedness, dizziness or syncope. He is accompanied by his wife. All questions regarding the procedure answered. His main symptom is that he is fatigued easily.            /81   Pulse 53   Temp 36.2 °C (97.2 °F) (Temporal)   Resp 20   Ht 1.651 m (5' 5\")   Wt 78.9 kg (174 lb)   SpO2 100%   BMI 28.96 kg/m²    Relevant Labs:   Lab Results   Component Value Date    CREATININE 1.06 05/22/2025    EGFR 72 05/22/2025       Planned Sedation/Anesthesia: Moderate    Airway assessment: normal    Directed physical examination: General: Alert and Oriented, No distress, cooperative. Lungs: Clear to auscultation bilaterally, no wheezes, rhonci, or rales. respirations unlabored Heart: regular rate and rhythm, S1 and S2, no murmur, pulses palpable     Mallampati: I (soft palate, uvula, fauces, and tonsillar pillars visible)    ASA Score: ASA 2 - Patient with mild systemic disease with no functional limitations    Benefits, risks and alternatives of procedure and planned sedation have been discussed with the patient and/or their representative. All questions answered and they agree to proceed.     SAJI Briceno-CNP   "

## 2025-05-27 NOTE — DISCHARGE INSTRUCTIONS
If you have any questions, please call the Cath Lab Nurse Practitioner Oliveivonne Rodriguez at [phone] and leave a message. She will return your call the same day if calling before 3 PM, M-F. If you call on the weekend you can expect a call back on Monday morning. You may also call the Cath Lab at [phone] M-F, 7-3:30 with any questions. Weekends and after hours please call your cardiologist office number to reach a physician on call. The heart group office number is [phone]           CARDIAC CATHETERIZATION DISCHARGE INSTRUCTIONS     FOR SUDDEN AND SEVERE CHEST PAIN, SHORTNESS OF BREATH, EXCESSIVE BLEEDING, SIGNS OF STROKE, OR CHANGES IN MENTAL STATUS YOU SHOULD CALL 911 IMMEDIATELY.     If your provider has prescribed aspirin and/or clopidogrel (Plavix), or prasugrel (Effient), or ticagrelor (Brilinta), DO NOT STOP THESE MEDICATIONS for any reason without talking to your cardiologist first. If any of these were prescribed, you must take them every day without missing a single dose. If you are getting low on these medications, contact your provider immediately for a refill.     FOR NEXT 24 HOURS  - Upon discharge, you should return home and rest for the remainder of the day and evening. You do not have to stay on bed rest but should not be very active.  It is recommended a responsible adult be with you for the first 24 hours after the procedure.    - No driving for 24 hours after procedure. Please arrange for someone to drive you home from the hospital today.     - Do not drive, operate machinery, or use power tools for 24 hours after your procedure.     - Do not make any legal decisions for 24 hours after your procedure.     - Do not drink alcoholic beverages for 24 hours after your procedure.    WOUND CARE   *FOR FEMORAL (LEG) ACCESS*  ·      Avoid heavy lifting (over 10 pounds) for 7 days, squatting or excessive bending for 2 days, and strenuous exercise for 7 days.  ·      No submerged bathing, swimming, or  hot tubs for the next 7 days, or until fully healed.  ·      Avoid sexual activity for 3-4 days until any groin discomfort has ceased.     *FOR RADIAL (WRIST) ACCESS*  ·      No lifting more than 5 pounds or excessive use of the wrist for 24 hours - for example, treat your wrist as if it is sprained.  ·      Do not engage in vigorous activities (tennis, golf, bowling, weights) for at least 48 hours after the procedure.  ·      Do not submerge the wrist for 7 days after the procedure.  ·      You should expect mild tingling in your hand and tenderness at the puncture site for up to 3 days.    - The transparent dressing should be removed from the site 24 hours after the procedure.  Wash the site gently with soap and water. Rinse well and pat dry. Keep the area clean and dry. You may apply a Band-Aid to the site. Avoid lotions, ointments, or powders until fully healed.     - You may shower the day after your procedure.      - It is normal to notice a small bruise around the puncture site and/or a small grape sized or smaller lump. Any large bruising or large lump warrants a call to the office.     - If bleeding should occur, lay down and apply pressure to the affected area for 10 minutes.  If the bleeding stops notify your physician.  If there is a large amount of bleeding or spurting of blood CALL 911 immediately.  DO NOT drive yourself to the hospital.    - You may experience some tenderness, bruising or minimal inflammation.  If you have any concerns, you may contact the Cath Lab or if any of these symptoms become excessive, contact your cardiologist or go to the emergency room.     OTHER INSTRUCTIONS  - You may take acetaminophen (Tylenol) as directed for discomfort.  If pain is not relieved with acetaminophen (Tylenol), contact your doctor.    - If you notice or experience any of the following, you should notify your doctor or seek medical attention  Chest pain or discomfort  Change in mental status or weakness in  extremities.  Dizziness, light headedness, or feeling faint.  Change in the site where the procedure was performed, such as bleeding or an increased area of bruising or swelling.  Tingling, numbness, pain, or coolness in the leg/arm beyond the site where the procedure was performed.  Signs of infection (i.e. shaking chills, temperature > 100 degrees Fahrenheit, warmth, redness) in the leg/arm area where the procedure was performed.  Changes in urination   Bloody or black stools  Vomiting blood  Severe nose bleeds  Any excessive bleeding    - If you DO NOT have an appointment with your cardiologist within 2-4 weeks following your procedure, please contact their office.      Important ways to reduce your risk of coronary artery disease by healthy diet, maintaining a healthy weight, exercising regularly and stop using tobacco products.     Mediterranean Diet    About this topic  This is a heart healthy diet. It is based on widely used foods and cooking styles from many countries around the [place]. The main pattern for the diet is more plant foods and monounsaturated fats, or good fats, like olive oil. Protein in this diet comes from seafood, legumes, nuts, seeds, and poultry and eggs in lowered amounts. You will also eat more whole grains, vegetables, and fruits and moderate amounts of alcohol are also included. This diet has less red meats, dairy products, and processed foods.    What will the results be?  Your diet will have less saturated fat, cholesterol, calories, sodium, and added sugars. Your diet will be higher in fiber. This will help to keep your blood sugar steady. This diet lowers the chance of heart disease and other health problems.  What lifestyle changes are needed?  If you do not often eat this way, you will need to change your eating habits. Be sure to get regular exercise. It is believed to help the health benefits of this diet.  What changes to diet are needed?  You may need to limit the  "amount of red meat and processed foods in your diet. Ask your dietitian for help planning meals that are right for you.  What foods are good to eat?  Plenty of fish and other seafood  Fresh, frozen, or canned fruits and vegetables  Nuts and nut butters and dried beans, lentils, or peas  Foods high in fiber like whole grains and whole grain products  Olive oil (good fat), peanut or canola oil, margarine, or spreads that list vegetable oil as the first ingredient and do not contain trans fat or partially hydrogenated oil  Small amounts of poultry and eggs  What foods should be limited or avoided?  Red meats  Sweets, desserts, and processed foods  Butter, oils, and fats that contain trans fats or are hydrogenated or partially hydrogenated  Gravies and sauces  What problems could happen?  Your weight may rise because your diet will be higher in fat from olive oil and nuts.  You may have lower iron levels. Be sure to eat foods rich in iron. Also, eat foods rich in vitamin C. This will help your body take in iron.  You may have lower calcium levels because you are eating less dairy products. Ask your doctor if you need to take a calcium supplement.  Wine is often thought of as part of a Mediterranean diet. It is not needed and you may choose not to include it. Avoid wine if you are prone to alcohol abuse or are pregnant. Also, avoid it if you are at risk for breast cancer, have liver problems, or have other illnesses that make it important for you to not have alcohol.  When do I need to call the doctor?  If you have any concerns about your diet     Health risks of obesity    The Basics  Written by the doctors and editors at [facility]  What does it mean to have obesity? -- Doctors use a calculation called \"body mass index,\" or \"BMI,\" to decide whether a person is underweight, at a healthy weight, overweight, or has obesity.  Your BMI will tell you which category you are in based on your weight and height (figure 1):  ?If " "your BMI is between 25 and 29.9, you are overweight.  ?If your BMI is 30 or greater, you have obesity.  Obesity is a problem, because it increases the risks of many different health problems. It can also make it hard for you to move, breathe, and do other things that people who are at a healthy weight can do easily.  What are the health risks of obesity? -- Having obesity increases a person's risk of developing many health problems. Here are just a few examples:  ?Diabetes  ?High blood pressure  ?High cholesterol  ?Heart disease (including heart attacks)  ?Stroke  ?Sleep apnea (a disorder in which you stop breathing for short periods while asleep)  ?Asthma  ?Cancer  Does having obesity shorten a person's life? -- Yes. Studies show that people with obesity die younger than people who are a healthy weight. They also show that the risk of death goes up the heavier a person is. The degree of increased risk depends on how long the person has had obesity, and on what other medical problems they have.  People with \"central obesity\" might also be at risk of dying younger. Central obesity means carrying extra weight in the belly area, even if the BMI is normal.  Should I see an expert? -- Yes. If you are overweight or have obesity, you can talk to your doctor or nurse. They might have suggestions for healthy ways to lose weight. It can also help to work with a dietitian (food and nutrition expert). A dietitian can help you choose healthy foods and plan meals.  Are there medical treatments that can help me lose weight? -- Yes. There are medicines and surgery to help with weight loss. But those treatments are only for people who have not been able to lose weight through lifestyle changes such as diet and exercise. Also, weight loss treatments do not take the place of diet and exercise. People who have those treatments must also change how they eat and how active they are.  What can I do to prevent the problems caused by " obesity? -- The best thing that you can do is lose weight. But even if weight loss is not possible, you can improve your health and lower your risk if you:  ?Become more active - Many types of physical activity can help, including walking. You can start with a few minutes a day and add more as you get stronger and build up your endurance. Anything that gets your body moving is good for you.  ?Improve your diet - It is healthy to have regular meal times, eat smaller portions, and not skip meals. Limit sweets, and avoid processed foods. Try to eat more vegetables and fruits instead.  ?Quit smoking (if you smoke) - Some people start eating more after they stop smoking, so try to make healthy food choices. Even if it increases your appetite, quitting smoking is still one of the best things that you can do to improve your health.  ?Limit alcohol - For females, drink no more than 1 drink a day. For males, drink no more than 2 drinks a day.  What causes obesity? -- The thing that increases a person's risk the most is having an unhealthy lifestyle. Most people develop obesity because they eat too much, eat unhealthy foods, and move too little. That's especially true of people who watch too much TV. But there are also other things that seem to increase the risk of obesity that many people do not know about. Here are some things that might affect a person's chance of developing obesity:  ?Mother's habits during and after pregnancy - People who eat a lot of calories, have diabetes, or smoke during pregnancy have a higher chance of having babies who have obesity as adults. Also, babies who drink formula might be more likely than  babies to develop obesity later in life.  ?Habits and weight gain during childhood - Children or teens who are overweight or have obesity are more likely to become have obesity as an adult.  ?Sleeping too little - People who do not get enough sleep are more likely to develop obesity than  "people who sleep enough.  ?Taking certain medicines - Long-term use of certain medicines, such as some medicines to treat depression, can cause weight gain. If you are concerned that 1 of your medicines might be making you gain weight, talk to your doctor or nurse. They might be able to switch you to a different medicine instead.  ?Certain hormonal conditions - Some hormonal problems can increase the risk of developing obesity. For example, a condition called \"polycystic ovary syndrome\" can cause weight gain, along with other symptoms like irregular periods.  What if I want to get pregnant? -- If you are overweight or have obesity, it might be harder to get pregnant. For males, obesity can also cause sex problems, like having trouble getting or keeping an erection. This is more likely if you also have high blood pressure or diabetes.  What if my child has obesity? -- In children, obesity has many of the same risks as it does in adults. For example, it can increase the risk of diabetes, high blood pressure, asthma, and sleep apnea. It can also cause added problems related to childhood. For example, obesity can make children grow faster than normal and cause girls to go through puberty earlier than usual.  All topics are updated as new evidence becomes available and our peer review process is complete.  This topic retrieved from [employer] on: Jan 11, 2024.  Topic 89534 Version 17.0  Release: 31.6.4 - C32.10  © 2024 [employer], [employer]. and/or its affiliates. All rights reserved.  figure 1: Your body mass index (BMI)        If you use tobacco products please review   Quitting smoking    The Basics  Written by the doctors and editors at [employer]  What are the benefits of quitting smoking? -- Quitting smoking can dramatically improve your health and help you live longer. It lowers your risk of heart disease, lung disease, kidney failure, cancer, infection, stomach problems, and diabetes.  Quitting smoking can also lower your " "chances of getting osteoporosis, a condition that makes your bones weak.  Quitting is not easy for most people, and it might take several tries to completely quit. But help and support are available. Quitting smoking will improve your health no matter how old you are, even if you have smoked for a long time.  What should I do if I want to quit smoking? -- It's a good idea to start by talking with your doctor or nurse. It is possible to quit on your own, without help. But getting help greatly increases your chances of quitting successfully.  When you are ready to quit, you will make a plan to:  ?Set a quit date.  ?Tell your family and friends that you plan to quit.  ?Plan ahead for the challenges you will face, such as cigarette cravings.  ?Remove cigarettes from your home, car, and work.  How can my doctor or nurse help? -- Your doctor or nurse can give you advice on the best way to quit. They can also give you medicines to:  ?Reduce your craving for cigarettes  ?Reduce your \"withdrawal\" symptoms (symptoms that happen when you stop smoking)  Your doctor or nurse can also help you find a counselor to talk to. For most people who are trying to quit smoking, it works best to use both medicines and counseling.  You can also get help from a free phone line (1-831-QUITNOW, or 1-228.214.7061) or go online to www.smokefree.gov.  What are the symptoms of withdrawal? -- When you stop smoking, you might have symptoms such as:  ?Trouble sleeping  ?Feeling irritable, anxious, or restless  ?Getting frustrated or angry  ?Having trouble thinking clearly  These symptoms can be hard to deal with, which is why it can be so hard to quit. But medicines can help.  Some people who stop smoking become temporarily depressed. Some people need treatment for depression, such as counseling or medicines or both. People with depression might:  ?No longer enjoy or care about doing the things they used to like to do  ?Feel sad, down, hopeless, " nervous, or cranky most of the day, almost every day  ?Lose or gain weight  ?Sleep too much or too little  ?Feel tired or like they have no energy  ?Feel guilty or like they are worth nothing  ?Forget things or feel confused  ?Move and speak more slowly than usual  ?Act restless or have trouble staying still  ?Think about death or suicide  If you think you might be depressed, tell your doctor or nurse right away. They can talk to you about your symptoms and recommend treatment if needed.  Get help right away if you are thinking of hurting or killing yourself! -- Sometimes, people with depression think of hurting or killing themselves. If you ever feel like you might hurt yourself, help is available:  ?In the , contact the [employer] [facility] Crisis Lifeline:  To speak to someone, call or text [employer].  To talk to someone online, go to www.[employer]/chat.  ?Call your doctor or nurse and tell them that it is an emergency.  ?Call for an ambulance (in the [place] and [place], call 9-1-1).  ?Go to the emergency department at your local hospital.  If you think your partner might have depression, or if you are worried that they might hurt themselves, get them help right away.  How does counseling work? -- A counselor can help you figure out:  ?What triggers you to want to smoke, and how to handle these situations  ?How to resist cravings  ?What you can do differently if you have tried to quit before  You can meet with a counselor in 1-on-1 sessions or as part of a group. There are other ways to get counseling, too, such as over the phone, through text messaging, or online.  How do medicines help you stop smoking? -- Different medicines work in different ways:  ?Nicotine replacement therapy - Nicotine is the main drug in cigarettes, and the reason they are addictive. These medicines reduce your body's craving for nicotine. They also help with withdrawal symptoms.  There are different forms of nicotine replacement, including skin  "patches, lozenges, gum, nasal sprays, and inhalers. Most can be bought without a prescription. Also, health insurance might cover some or all of the cost.  It often helps to use 2 forms of nicotine replacement. For example, you might wear a patch all the time, plus use gum or lozenges when you get a craving to smoke.  ?Varenicline - Varenicline (brand names: Chantix, Champix) is a prescription medicine that reduces withdrawal symptoms and cigarette cravings. Varenicline can increase the effects of alcohol in some people. It's a good idea to limit drinking while you're taking it, at least until you know how it affects you.  Even if you are not yet ready to commit to a quit date, varenicline can help reduce cravings. This can make it easier to quit when you are ready.  ?Bupropion - Bupropion (sample brand names: Wellbutrin, Zyban) is a prescription medicine that reduces your desire to smoke. It is also available in a generic version, which is cheaper than the brand name medicines. Doctors do not usually prescribe bupropion for people with seizures or who have had seizures in the past.  It might also be helpful to combine nicotine replacement with bupropion or varenicline. In some cases, a person might even take both bupropion and varenicline. Your doctor or nurse can help you figure out the best combination for you.  What about e-cigarettes? -- Sometimes people wonder if using electronic cigarettes, or \"e-cigarettes,\" can help them quit smoking. Using e-cigarettes is also called \"vaping.\" Doctors do not recommend e-cigarettes in place of using of medicines and counseling. That's because e-cigarettes still contain nicotine as well as other substances that might be harmful. It's also not clear how they can affect a person's health in the long term.  What if I am pregnant and I smoke? -- If you are pregnant, it's really important for the health of your baby that you quit. Ask your doctor what options you have, and what " is safest for your baby.  What if I have already smoked for a long time? -- The longer you have smoked, the higher your chances are of having health problems. But it is never too late to quit smoking. It helps your health even if you are older or have smoked for many years. The best thing you can do to lower your chance of having a health problem caused by smoking is to quit.  Will I gain weight if I quit? -- You might gain a few pounds. This can be frustrating for some people. But it's important to remember that you are improving your health by quitting smoking. You can help prevent gaining a lot of weight by staying active and eating a healthy diet.  What if I am not able to quit? -- If you don't quit on your first try, or if you quit but then start smoking again, don't give up hope. Lots of people have to try more than once before they are able to completely quit.  It might help to try to understand why quitting did not work. There might be something you can do differently when you try again. It can help to figure out which situations make you want to smoke, so you can avoid them.  What else can I do to improve my chances of quitting? -- You can:  ?Get regular exercise. Any type of physical activity, even gentle forms of movement, is good for your health. Physical activity can also help reduce stress.  ?Stay away from people who smoke and places that make you want to smoke. If people close to you smoke, ask them to quit with you or avoid smoking around you.  ?Carry gum, hard candy, or something to put in your mouth. If you get a craving for a cigarette, try 1 of these instead.  ?Don't give up, even if you start smoking again. It takes most people a few tries before they succeed.  All topics are updated as new evidence becomes available and our peer review process is complete.

## 2025-05-28 LAB
EJECTION FRACTION APICAL 4 CHAMBER: 64.6
EJECTION FRACTION: 58 %
GLOBAL LONGITUDINAL STRAIN: 17 %
LEFT ATRIUM VOLUME AREA LENGTH INDEX BSA: 18.6 ML/M2
LEFT VENTRICLE INTERNAL DIMENSION DIASTOLE: 4.91 CM (ref 3.5–6)
LEFT VENTRICULAR OUTFLOW TRACT DIAMETER: 2 CM
LV EJECTION FRACTION BIPLANE: 58 %
MITRAL VALVE E/A RATIO: 1.16
RIGHT VENTRICLE FREE WALL PEAK S': 16.49 CM/S
RIGHT VENTRICLE PEAK SYSTOLIC PRESSURE: 21.2 MMHG
TRICUSPID ANNULAR PLANE SYSTOLIC EXCURSION: 2.5 CM

## 2025-05-30 ENCOUNTER — TELEPHONE (OUTPATIENT)
Dept: CARDIOLOGY | Facility: HOSPITAL | Age: 75
End: 2025-05-30
Payer: MEDICARE

## 2025-05-30 NOTE — TELEPHONE ENCOUNTER
RN called pt at this time regarding results, no answer at this time. RN left message for patient to followup.

## 2025-05-30 NOTE — TELEPHONE ENCOUNTER
----- Message from Heaven Stevens sent at 5/29/2025  3:13 PM EDT -----    ----- Message -----  From: Torsten Cabello MD  Sent: 5/25/2025  12:16 PM EDT  To: Vickie Aden RN    Abnormal, make sure patient on statin if can tolerate  ----- Message -----  From: [facility]  Sent: 5/23/2025   3:30 AM EDT  To: Torsten Cabello MD     [facility] ADULT PROGRESS NOTE    24 HOUR EVENTS  Failed voiding trial yesterday.    SUBJECTIVE  No complaints.    OBJECTIVE    Vital signs:   Vitals:    12/24/24 1902 12/25/24 0300 12/25/24 0431 12/25/24 0830   BP:   117/69 111/68   BP Location:   Right arm Right arm   Pulse: 82 84 89    Resp:   20 17   Temp:   97.5 °F (36.4 °C) 98.3 °F (36.8 °C)   TempSrc:   Oral Oral   SpO2:    99%   Weight:           IOs:    Date 12/25/24 0700 - 12/26/24 0659   Shift 2582-2402 5506-3625 9209-8844 24 Hour Total   INTAKE   Shift Total(mL/kg)       OUTPUT   Urine(mL/kg/hr) 1800   1800   Shift Total(mL/kg) 1800(24)   1800(24)   Weight (kg) 74.9 74.9 74.9 74.9         Physical examination:  Constitutional: General appearance: appears well, alert and oriented x 3, pleasant and cooperative.  HEENT: Neck supple  Chest: Normal respiratory effort  CV: Normal radial pulse  Abdomen: Soft without tenderness, guarding. No CVA tenderness.  Extremities: Peripheral pulses are normal. No edema appreciated.  Shields draining yellow urine    REVIEW OF LABORATORY, PATHOLOGY, AND RADIOLOGY DATA    CBC:    Recent Labs   Lab 12/25/24  0424   RBC 2.31*   HGB 7.4*   HCT 22.1*   MCV 95.7   MCH 32.0   MCHC 33.5   RDW 23.4*   NEPRELIM 4.21   WBC 6.5   .0       BMP:    Recent Labs   Lab 12/23/24  1221 12/24/24  0356 12/25/24  0424   * 94 98   BUN 28* 26* 21   CREATSERUM 1.29 0.95 0.85   CA 8.9 8.6* 8.4*    138 139   K 4.1 3.9 3.8    108 109   CO2 23.0 25.0 25.0       UA:   Recent Labs   Lab 12/21/24  2141   COLORUR Red*   CLARITY Turbid*   SPECGRAVITY 1.020   WBCUR >50*  >50*   RBCUR >10*  >10*   BACUR Rare*  Rare*   EPIUR None Seen  None Seen         IMPRESSION/ PLAN  89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  Presented with gross hematuria.  S/p clot evacuation 12/22/24.  Failed a voiding trial yesterday.  Shields in place with yellow urine today.  Can attempt another voiding trial in a week.  This could potentially  be done at rehab facility.    Urology will follow peripherally at this point.      Mayte Wayne MD - covering for [facility] for holiday  [facility]  O: [phone]

## 2025-06-02 ENCOUNTER — TELEPHONE (OUTPATIENT)
Dept: CARDIOLOGY | Facility: HOSPITAL | Age: 75
End: 2025-06-02
Payer: MEDICARE

## 2025-06-02 DIAGNOSIS — I25.85 CHRONIC CORONARY MICROVASCULAR DYSFUNCTION: ICD-10-CM

## 2025-06-02 RX ORDER — ISOSORBIDE MONONITRATE 30 MG/1
15 TABLET, EXTENDED RELEASE ORAL DAILY
Qty: 15 TABLET | Refills: 2 | Status: SHIPPED | OUTPATIENT
Start: 2025-06-02 | End: 2025-08-31

## 2025-06-02 NOTE — TELEPHONE ENCOUNTER
RN called pt at this time with Dr. Cabello directives due to having terrible headaches. Per Dr. Cabello to cut the dose of Imdur in half and to see if he can tolerate that. Pt states he will try and cut his pills and see how it does. Pt directed to call either way if it helps or does not. Pt verbalized understanding.

## 2025-06-02 NOTE — TELEPHONE ENCOUNTER
Jonathan called instating that he is having a bed reaction to his medication Dr. Cabello prescribed him.    Routing to RN pool for support.      Thank you!  Narendra DAVID

## 2025-06-12 ENCOUNTER — TELEPHONE (OUTPATIENT)
Dept: CARDIOLOGY | Facility: HOSPITAL | Age: 75
End: 2025-06-12
Payer: MEDICARE

## 2025-06-12 NOTE — TELEPHONE ENCOUNTER
SK / Patient called in and LVM stating that even after the RN told him to take the half a dose he has still been getting headaches. Patient states that he cannot tolerate the medication and the headaches and has not been taking any medications.     Routing to RN pool for support and call back.    Thank you!  Narendra DAVID

## 2025-06-13 DIAGNOSIS — I20.89 ATYPICAL ANGINA: Primary | ICD-10-CM

## 2025-06-13 DIAGNOSIS — I25.85 CHRONIC CORONARY MICROVASCULAR DYSFUNCTION: ICD-10-CM

## 2025-06-13 NOTE — TELEPHONE ENCOUNTER
6/17/25  1208  Patient returned call.    Informed patient of medication switch; patient verbalized understanding and was agreeable.    Imdur discontinued and Ranexa 500 mg BID sent for approval.    6/16/25  1325  Called patient; no answer. Left voice message for patient to return call for discussing medication switch.      6/13/25  1226  Called patient; no answer. Left voice message for patient to return call for discussing medication switch.

## 2025-06-17 RX ORDER — RANOLAZINE 500 MG/1
500 TABLET, EXTENDED RELEASE ORAL 2 TIMES DAILY
Qty: 60 TABLET | Refills: 11 | Status: SHIPPED | OUTPATIENT
Start: 2025-06-17 | End: 2026-06-17

## 2025-06-19 ENCOUNTER — HOSPITAL ENCOUNTER (OUTPATIENT)
Dept: CARDIOLOGY | Facility: HOSPITAL | Age: 75
Discharge: HOME | End: 2025-06-19
Payer: MEDICARE

## 2025-06-19 ENCOUNTER — OFFICE VISIT (OUTPATIENT)
Dept: CARDIOLOGY | Facility: HOSPITAL | Age: 75
End: 2025-06-19
Payer: MEDICARE

## 2025-06-19 VITALS
WEIGHT: 177.6 LBS | DIASTOLIC BLOOD PRESSURE: 76 MMHG | SYSTOLIC BLOOD PRESSURE: 120 MMHG | HEART RATE: 56 BPM | HEIGHT: 65 IN | BODY MASS INDEX: 29.59 KG/M2

## 2025-06-19 DIAGNOSIS — Z23 FLU VACCINE NEED: ICD-10-CM

## 2025-06-19 DIAGNOSIS — E78.5 DYSLIPIDEMIA, GOAL LDL BELOW 100: ICD-10-CM

## 2025-06-19 DIAGNOSIS — T73.3XXD FATIGUE DUE TO EXCESSIVE EXERTION, SUBSEQUENT ENCOUNTER: ICD-10-CM

## 2025-06-19 DIAGNOSIS — I10 ESSENTIAL HYPERTENSION: ICD-10-CM

## 2025-06-19 DIAGNOSIS — R06.02 EXERTIONAL SHORTNESS OF BREATH: ICD-10-CM

## 2025-06-19 DIAGNOSIS — R94.39 ABNORMAL STRESS TEST: ICD-10-CM

## 2025-06-19 DIAGNOSIS — I73.9 CLAUDICATION: Primary | ICD-10-CM

## 2025-06-19 DIAGNOSIS — I20.89 ATYPICAL ANGINA: ICD-10-CM

## 2025-06-19 PROCEDURE — 99214 OFFICE O/P EST MOD 30 MIN: CPT | Performed by: INTERNAL MEDICINE

## 2025-06-19 PROCEDURE — 3078F DIAST BP <80 MM HG: CPT | Performed by: INTERNAL MEDICINE

## 2025-06-19 PROCEDURE — 93005 ELECTROCARDIOGRAM TRACING: CPT | Performed by: INTERNAL MEDICINE

## 2025-06-19 PROCEDURE — 99215 OFFICE O/P EST HI 40 MIN: CPT | Performed by: INTERNAL MEDICINE

## 2025-06-19 PROCEDURE — 1159F MED LIST DOCD IN RCRD: CPT | Performed by: INTERNAL MEDICINE

## 2025-06-19 PROCEDURE — 3074F SYST BP LT 130 MM HG: CPT | Performed by: INTERNAL MEDICINE

## 2025-06-19 PROCEDURE — 93005 ELECTROCARDIOGRAM TRACING: CPT

## 2025-06-19 RX ORDER — CHLORTHALIDONE 25 MG/1
12.5 TABLET ORAL DAILY
Start: 2025-06-19

## 2025-06-19 RX ORDER — ATORVASTATIN CALCIUM 10 MG/1
10 TABLET, FILM COATED ORAL DAILY
Start: 2025-06-19

## 2025-06-19 RX ORDER — AMLODIPINE BESYLATE 5 MG/1
5 TABLET ORAL DAILY
Qty: 30 TABLET | Refills: 3 | Status: SHIPPED | OUTPATIENT
Start: 2025-06-19 | End: 2026-06-19

## 2025-06-19 NOTE — PROGRESS NOTES
Chief Complaint:   No chief complaint on file.     History Of Present Illness:    Jonathan Watts is a 75 y.o. male presenting for consult, [facility] patient. Here with wife.  H/o LVH by EKG criteria, HTN, HLD, DM2.    Pt was referred to Dr. Cabello for abnormal stress test. Per Dr. Cabello's note 5/1/25:  Ongoing symptoms for couple of years.  Due to exertional fatigue/exertion patient underwent a stress test which was abnormal.  Patient had EKG changes and new LBBB during the test.  There are regional wall motion abnormalities at the same time. Patient has been on aspirin and statin therapy.   FLP not yet drawn but still active. Lp(a) 137. [facility] 5/25 showed small branch vessel occlusion in the ramus vessel which is not a target for intervention, otherwise non obstructive CAD.    Today, Pt continues to experience exertional fatigue.  He also mentions that he has had leg issues for a couple of years. He wonders if sxs are related to deconditioning. He feels there is something in his leg and there has been a change. Denies leg pain, but rather weakness and fatigue. He thinks he could walk 100 yards without pain but would have weakness. He states he has arthritis in all of his joints. Former smoker, quit at age 25. Denies swelling.    Review Of Systems:  The remainder of the review of systems was obtained, and was negative as pertains to the chief complaint.    CV testing and labs reviewed:  Labs 5/2025:  K 4.6  BUN 16  Cr 1.06  Lp(a) 137  H&H 14.8 44.6  Lipid panel 9/2024:    HDL 35.8  LDL 64      C 5/2025:   1. Small branch vessel occlusion in the ramus vessel which is not a target for intervention. Otherwise non obstructive CAD as described above.   2. Sluggish flow in all vessel beds, suspect microvascular dysfunction/angina. Recommend medical therapy.   3. Left Ventricular end-diastolic pressure = 7 mmHg.    TTE 5/2025:   1. The left ventricular systolic function is normal, with a Carpenter's biplane calculated  ejection fraction of 58%.   2. Spectral Doppler shows a Grade II (pseudonormal pattern) of left ventricular diastolic filling with an elevated left atrial pressure.   3. There is normal right ventricular global systolic function.   4. Aortic valve stenosis is not present.   5. Left Ventricular Global Longitudinal Strain - 17.0 %.     Exercise Stress Echo 4/2025:   1. Abnormal Stress Test.   2. Adequate level of stress achieved.   3. Normal global left ventricular systolic function.   4. ECG changes consistent with ischemia. There is ST depression in inferior leads and patient develoed transient LBBB during exercise that resolved in recovery.   5. At peak, there are stress-induced regional wall motion abnormalities.   6. Stage 2: Impost: Entire anterior septum, mid and apical inferior septum, and apex are abnormal.   7. Patient instructed regarding abnormal stress test. Referring provider's office notified of abnormal stress test results.    Last Recorded Vitals:  There were no vitals filed for this visit.    Past Medical History:  He has a past medical history of Abnormal posture (12/04/2020), Anxiety, BPH (benign prostatic hyperplasia), Cataract, Complex regional pain syndrome i of upper limb, bilateral (07/21/2020), Easy bruising, Encounter for screening for malignant neoplasm of prostate (03/16/2021), GERD (gastroesophageal reflux disease), Hearing aid worn, HL (hearing loss), Hyperlipidemia, Hypertension, Impingement syndrome of left shoulder (11/17/2020), Impingement syndrome of right shoulder (03/16/2021), Migraine, unspecified, not intractable, without status migrainosus, Other intervertebral disc degeneration, lumbar region (10/23/2020), Other obesity due to excess calories (03/22/2022), Pain in left leg (09/18/2020), Pain in right hip (11/17/2020), Pain in right leg (08/03/2020), Pain in right leg (10/01/2020), Paresthesia of skin (07/21/2020), Personal history of other diseases of male genital organs,  Personal history of other diseases of the circulatory system, Personal history of other diseases of the digestive system, Personal history of other diseases of the nervous system and sense organs, Personal history of other specified conditions (03/02/2020), Personal history of other specified conditions (07/21/2020), Personal history of other specified conditions (09/08/2020), Personal history of other specified conditions (09/18/2020), Polymyalgia rheumatica (Multi) (09/08/2020), Radiculopathy, lumbar region (11/17/2020), Spinal stenosis, lumbar region with neurogenic claudication (10/23/2020), Type 2 diabetes mellitus, Unspecified mononeuropathy of bilateral lower limbs (09/08/2020), Vision loss, Weakness (12/04/2020), and Wears partial dentures.    Past Surgical History:  He has a past surgical history that includes Other surgical history (02/27/2020); Cataract extraction w/  intraocular lens implant (Left); and Cardiac catheterization (N/A, 5/27/2025).      Social History:  He reports that he quit smoking about 51 years ago. His smoking use included cigarettes. He started smoking about 55 years ago. He has a 0.4 pack-year smoking history. He has never used smokeless tobacco. He reports that he does not currently use alcohol after a past usage of about 1.0 standard drink of alcohol per week. He reports that he does not use drugs.    Family History:  Family History[1]     Allergies:  Patient has no known allergies.    Outpatient Medications:  Current Outpatient Medications   Medication Instructions    albuterol (Ventolin HFA) 90 mcg/actuation inhaler 2 puffs, inhalation, Every 4 hours PRN    aspirin 81 mg EC tablet 1 tablet, Daily    atorvastatin (LIPITOR) 10 mg, oral, Daily    chlorthalidone (HYGROTON) 25 mg, oral, Daily    losartan (COZAAR) 100 mg, oral, Daily    metFORMIN (GLUCOPHAGE) 500 mg, oral, Take with food.    omeprazole (PRILOSEC) 20 mg, oral, Daily, Take (1) capsule by mouth once daily in the morning  before breakfast.    polyethylene glycol (Glycolax, Miralax) 17 gram packet Mix 17 g (one packet) into 6-8 ounces of liquid and drink by mouth once daily.    ranolazine (RANEXA) 500 mg, oral, 2 times daily, Do not crush, chew, or split.    sertraline (ZOLOFT) 50 mg, oral, Daily       Physical Exam:  Physical Exam  Vitals reviewed.   HENT:      Head: Normocephalic.      Nose: Nose normal.      Mouth/Throat:      Mouth: Mucous membranes are moist.   Cardiovascular:      Rate and Rhythm: Normal rate and regular rhythm.      Heart sounds: No murmur heard.  Pulmonary:      Effort: Pulmonary effort is normal.      Breath sounds: Normal breath sounds.   Abdominal:      General: Abdomen is flat.      Palpations: Abdomen is soft.   Musculoskeletal:         General: Normal range of motion.      Cervical back: Normal range of motion.      Right lower leg: Edema present.      Left lower leg: Edema present.      Comments: Trace pitting edema  1+ pedal pulse BL   Skin:     General: Skin is warm and dry.   Neurological:      General: No focal deficit present.      Mental Status: He is alert.   Psychiatric:         Mood and Affect: Mood normal.        Last Labs:  CBC -  Lab Results   Component Value Date    WBC 6.5 05/22/2025    HGB 14.8 05/22/2025    HCT 44.6 05/22/2025    MCV 89.9 05/22/2025     05/22/2025       CMP -  Lab Results   Component Value Date    CALCIUM 9.3 05/22/2025    PROT 6.7 09/23/2024    ALBUMIN 4.3 09/23/2024    AST 16 09/23/2024    ALT 8 (L) 09/23/2024    ALKPHOS 92 09/23/2024    BILITOT 0.9 09/23/2024       LIPID PANEL -   Lab Results   Component Value Date    CHOL 121 09/23/2024    TRIG 106 09/23/2024    HDL 35.8 09/23/2024    CHHDL 3.4 09/23/2024    LDLF 54 09/18/2023    VLDL 21 09/23/2024    NHDL 85 09/23/2024       RENAL FUNCTION PANEL -   Lab Results   Component Value Date    GLUCOSE 129 (H) 05/22/2025     05/22/2025    K 4.6 05/22/2025     05/22/2025    CO2 28 05/22/2025    ANIONGAP 9  05/22/2025    BUN 16 05/22/2025    CREATININE 1.06 05/22/2025    GFRMALE 74 09/18/2023    CALCIUM 9.3 05/22/2025    ALBUMIN 4.3 09/23/2024        Lab Results   Component Value Date    HGBA1C 6.4 (H) 09/23/2024     Assessment/Plan   Abnormal stress test 4/25  LVH by EKG criteria  Essential hypertension  Diabetes mellitus type 2  Hyperlipidemia    [facility] 5/25 showed small branch vessel occlusion in the ramus vessel which is not a target for intervention, otherwise non obstructive CAD.  Did not tolerate nitrate due to headache.  We will continue to treat Pt medically for possible microvascular disease, vasospasm, etc.  - see medication changes below.     With concern for possible claudication (vs pseudoclaudication) - I recommended vascular lab testing for his legs.     Plan:  -continue atorvastatin 10 mg daily  -continue losartan   -instructed to decrease chlorthalidone 25 mg to 1/2 tab daily   -added amlodipine 5 mg daily  -informed Pt can  Ranexa at his pharmacy but not start it yet  -check vascular US PVR with exercise   -FU in 3 mos and reassess at that time    Scribe Attestation  By signing my name below, I, Joyce Torres, Scribe   attest that this documentation has been prepared under the direction and in the presence of Lidya Gan MD.         [1]   Family History  Problem Relation Name Age of Onset    Stomach cancer Mother      Stroke Father      Coronary artery disease Father

## 2025-06-19 NOTE — PATIENT INSTRUCTIONS
Thanks for following up in office today.    1)  We reviewed your recent cardiac testing - I recommend you continue to be treated medically for your small vessel heart disease.     2)  Please take 1/2 tab of your chorthalidone once daily.     3)  You can  Ranexa at your pharmacy but do not start it yet. We want to make small changes to your medications first.    4)  I added amlodipine 5 mg daily to your medication regimen.    5)  I ordered a vascular ultrasound for your legs. This is like a stress test for your legs to check for vascular causes of your leg symptoms.     6)  Please continue your cardiac medications as prescribed.    Follow up with Dr. Gan in 3 months  If you have any questions, please call us at [phone]

## 2025-06-20 LAB
ATRIAL RATE: 56 BPM
P AXIS: 34 DEGREES
P OFFSET: 180 MS
P ONSET: 133 MS
PR INTERVAL: 176 MS
Q ONSET: 221 MS
QRS COUNT: 9 BEATS
QRS DURATION: 84 MS
QT INTERVAL: 444 MS
QTC CALCULATION(BAZETT): 428 MS
QTC FREDERICIA: 434 MS
R AXIS: -10 DEGREES
T AXIS: 19 DEGREES
T OFFSET: 443 MS
VENTRICULAR RATE: 56 BPM

## 2025-07-05 LAB
25(OH)D3+25(OH)D2 SERPL-MCNC: 36 NG/ML (ref 30–100)
ALBUMIN SERPL-MCNC: 4.3 G/DL (ref 3.6–5.1)
ALBUMIN/CREAT UR: 3 MG/G CREAT
ALP SERPL-CCNC: 89 U/L (ref 35–144)
ALT SERPL-CCNC: 9 U/L (ref 9–46)
ANION GAP SERPL CALCULATED.4IONS-SCNC: 10 MMOL/L (CALC) (ref 7–17)
AST SERPL-CCNC: 17 U/L (ref 10–35)
BASOPHILS # BLD AUTO: 111 CELLS/UL (ref 0–200)
BASOPHILS NFR BLD AUTO: 1.5 %
BILIRUB SERPL-MCNC: 1.3 MG/DL (ref 0.2–1.2)
BUN SERPL-MCNC: 17 MG/DL (ref 7–25)
CALCIUM SERPL-MCNC: 9.6 MG/DL (ref 8.6–10.3)
CHLORIDE SERPL-SCNC: 104 MMOL/L (ref 98–110)
CHOLEST SERPL-MCNC: 117 MG/DL
CHOLEST/HDLC SERPL: 3.1 (CALC)
CO2 SERPL-SCNC: 27 MMOL/L (ref 20–32)
CREAT SERPL-MCNC: 1.15 MG/DL (ref 0.7–1.28)
CREAT UR-MCNC: 165 MG/DL (ref 20–320)
EGFRCR SERPLBLD CKD-EPI 2021: 66 ML/MIN/1.73M2
EOSINOPHIL # BLD AUTO: 451 CELLS/UL (ref 15–500)
EOSINOPHIL NFR BLD AUTO: 6.1 %
ERYTHROCYTE [DISTWIDTH] IN BLOOD BY AUTOMATED COUNT: 14.5 % (ref 11–15)
EST. AVERAGE GLUCOSE BLD GHB EST-MCNC: 128 MG/DL
EST. AVERAGE GLUCOSE BLD GHB EST-SCNC: 7.1 MMOL/L
GLUCOSE SERPL-MCNC: 131 MG/DL (ref 65–99)
HBA1C MFR BLD: 6.1 %
HCT VFR BLD AUTO: 45.3 % (ref 38.5–50)
HDLC SERPL-MCNC: 38 MG/DL
HGB BLD-MCNC: 14.8 G/DL (ref 13.2–17.1)
LDLC SERPL CALC-MCNC: 60 MG/DL (CALC)
LYMPHOCYTES # BLD AUTO: 1650 CELLS/UL (ref 850–3900)
LYMPHOCYTES NFR BLD AUTO: 22.3 %
MCH RBC QN AUTO: 29.5 PG (ref 27–33)
MCHC RBC AUTO-ENTMCNC: 32.7 G/DL (ref 32–36)
MCV RBC AUTO: 90.4 FL (ref 80–100)
MICROALBUMIN UR-MCNC: 0.5 MG/DL
MONOCYTES # BLD AUTO: 651 CELLS/UL (ref 200–950)
MONOCYTES NFR BLD AUTO: 8.8 %
NEUTROPHILS # BLD AUTO: 4536 CELLS/UL (ref 1500–7800)
NEUTROPHILS NFR BLD AUTO: 61.3 %
NONHDLC SERPL-MCNC: 79 MG/DL (CALC)
PLATELET # BLD AUTO: 253 THOUSAND/UL (ref 140–400)
PMV BLD REES-ECKER: 8.8 FL (ref 7.5–12.5)
POTASSIUM SERPL-SCNC: 4.1 MMOL/L (ref 3.5–5.3)
PROT SERPL-MCNC: 6.6 G/DL (ref 6.1–8.1)
RBC # BLD AUTO: 5.01 MILLION/UL (ref 4.2–5.8)
SODIUM SERPL-SCNC: 141 MMOL/L (ref 135–146)
TRIGL SERPL-MCNC: 104 MG/DL
TSH SERPL-ACNC: 1.05 MIU/L (ref 0.4–4.5)
VIT B12 SERPL-MCNC: 302 PG/ML (ref 200–1100)
WBC # BLD AUTO: 7.4 THOUSAND/UL (ref 3.8–10.8)

## 2025-07-07 ENCOUNTER — APPOINTMENT (OUTPATIENT)
Dept: PRIMARY CARE | Facility: CLINIC | Age: 75
End: 2025-07-07
Payer: MEDICARE

## 2025-07-07 VITALS
HEART RATE: 68 BPM | DIASTOLIC BLOOD PRESSURE: 60 MMHG | WEIGHT: 177 LBS | BODY MASS INDEX: 29.49 KG/M2 | SYSTOLIC BLOOD PRESSURE: 118 MMHG | HEIGHT: 65 IN

## 2025-07-07 DIAGNOSIS — I25.118 CORONARY ARTERY DISEASE OF NATIVE ARTERY OF NATIVE HEART WITH STABLE ANGINA PECTORIS: Primary | ICD-10-CM

## 2025-07-07 DIAGNOSIS — E11.69 DYSLIPIDEMIA ASSOCIATED WITH TYPE 2 DIABETES MELLITUS: ICD-10-CM

## 2025-07-07 DIAGNOSIS — I44.7: ICD-10-CM

## 2025-07-07 DIAGNOSIS — E11.65 TYPE 2 DIABETES MELLITUS WITH HYPERGLYCEMIA, WITHOUT LONG-TERM CURRENT USE OF INSULIN: ICD-10-CM

## 2025-07-07 DIAGNOSIS — K40.91 RECURRENT LEFT INGUINAL HERNIA: ICD-10-CM

## 2025-07-07 DIAGNOSIS — E78.5 DYSLIPIDEMIA ASSOCIATED WITH TYPE 2 DIABETES MELLITUS: ICD-10-CM

## 2025-07-07 DIAGNOSIS — I11.9 HYPERTENSIVE HEART DISEASE WITHOUT HEART FAILURE: ICD-10-CM

## 2025-07-07 PROBLEM — R06.02 EXERTIONAL SHORTNESS OF BREATH: Status: RESOLVED | Noted: 2025-04-04 | Resolved: 2025-07-07

## 2025-07-07 PROBLEM — R29.898 WEAKNESS OF RIGHT LOWER EXTREMITY: Status: RESOLVED | Noted: 2024-10-10 | Resolved: 2025-07-07

## 2025-07-07 PROBLEM — R94.39 ABNORMAL STRESS TEST: Status: RESOLVED | Noted: 2025-05-01 | Resolved: 2025-07-07

## 2025-07-07 PROBLEM — Z98.890 HX OF BILATERAL INGUINAL HERNIA REPAIR: Status: ACTIVE | Noted: 2025-07-07

## 2025-07-07 PROBLEM — M75.81 ROTATOR CUFF TENDONITIS, RIGHT: Status: RESOLVED | Noted: 2023-02-09 | Resolved: 2025-07-07

## 2025-07-07 PROBLEM — Z23 FLU VACCINE NEED: Status: RESOLVED | Noted: 2024-09-27 | Resolved: 2025-07-07

## 2025-07-07 PROBLEM — T73.3XXA FATIGUE DUE TO EXCESSIVE EXERTION: Status: RESOLVED | Noted: 2025-04-04 | Resolved: 2025-07-07

## 2025-07-07 PROBLEM — M75.82 TENDINITIS OF LEFT ROTATOR CUFF: Status: RESOLVED | Noted: 2023-02-09 | Resolved: 2025-07-07

## 2025-07-07 PROBLEM — Z87.19 HX OF BILATERAL INGUINAL HERNIA REPAIR: Status: ACTIVE | Noted: 2025-07-07

## 2025-07-07 PROBLEM — U07.1 COVID-19: Status: RESOLVED | Noted: 2023-02-09 | Resolved: 2025-07-07

## 2025-07-07 PROBLEM — Z00.00 ROUTINE GENERAL MEDICAL EXAMINATION AT HEALTH CARE FACILITY: Status: RESOLVED | Noted: 2025-04-04 | Resolved: 2025-07-07

## 2025-07-07 PROBLEM — I20.89 ATYPICAL ANGINA: Status: RESOLVED | Noted: 2025-05-01 | Resolved: 2025-07-07

## 2025-07-07 PROCEDURE — 1160F RVW MEDS BY RX/DR IN RCRD: CPT | Performed by: INTERNAL MEDICINE

## 2025-07-07 PROCEDURE — 99214 OFFICE O/P EST MOD 30 MIN: CPT | Performed by: INTERNAL MEDICINE

## 2025-07-07 PROCEDURE — 1036F TOBACCO NON-USER: CPT | Performed by: INTERNAL MEDICINE

## 2025-07-07 PROCEDURE — 1159F MED LIST DOCD IN RCRD: CPT | Performed by: INTERNAL MEDICINE

## 2025-07-07 NOTE — ASSESSMENT & PLAN NOTE
LDL cholesterol optimal at 60 with triglycerides 104 HDL of 38 continue atorvastatin 10 mg daily  Orders:    Comprehensive Metabolic Panel; Future    Hemoglobin A1C; Future    Lipid Panel; Future    Albumin-Creatinine Ratio, Urine Random; Future

## 2025-07-07 NOTE — ASSESSMENT & PLAN NOTE
Patient developed intermittent left bundle branch block with stress test resulting in cardiac catheterization with microvascular disease stable

## 2025-07-07 NOTE — ASSESSMENT & PLAN NOTE
Patient with established microvascular disease per cardiac catheterization started on Ranexa 500 mg twice a day by cardiologist reevaluate symptom control also placed on amlodipine 5 mg daily

## 2025-07-07 NOTE — ASSESSMENT & PLAN NOTE
Hemoglobin A1c stable at 6.1 with fasting blood sugar 131 continue metformin 500 mg twice a day with food consider SGLT2 inhibitor  Orders:    Comprehensive Metabolic Panel; Future    Hemoglobin A1C; Future    Lipid Panel; Future    Albumin-Creatinine Ratio, Urine Random; Future    Follow Up In [facility] - PCP - Established; Future

## 2025-07-07 NOTE — ASSESSMENT & PLAN NOTE
Referral to general surgery for evaluation and possible repair  Orders:    Referral to General Surgery; Future

## 2025-07-07 NOTE — PROGRESS NOTES
Subjective   Jonathan Watts is a 75 y.o. male who presents for Follow-up.  HPI  This is a diabetes follow up visit for Jonathan Watts. The current treatment includes diet, exercise, and oral medications and he is compliant all of the time. Symptoms include none. Glucose is monitored: once daily, and average sugars are less than 100. he reports good compliance with a low carbohydrate diet, and exercises daily.    Patient is prescribed an ACE/ARB/ARNI medication   Patient is prescribed a STATIN medication  Patient is not prescribed a SGLT2/GLP1 medication    Last Flu Vaccine given:            09/27/2024   Last PCV Vaccine given:   02/27/2017    Lab Results   Component Value Date    HGBA1C 6.1 (H) 07/03/2025    CREATININE 1.15 07/03/2025    MICROALBCREA 3 07/03/2025    LDLCALC 60 07/03/2025        Last Eye Exam: patient reports annual screening by optometry/opthalmology     Liver Screening: FIB-4 Calculation: 1.72 at 7/3/2025 11:12 AM  Calculated from:  SGOT/AST: 17 U/L at 7/3/2025 11:12 AM  SGPT/ALT: 9 U/L at 7/3/2025 11:12 AM  Platelets: 253 Thousand/uL at 7/3/2025 11:12 AM  Age: 75 years    Interpretation: <1.45 Cirrhosis less likely, 1.45 - 3.25 Indeterminate, >3.25 Cirrhosis more likely    Review of Systems   All other systems reviewed and are negative.    Visit Vitals  /60   Pulse 68   Body mass index is 29.45 kg/m².   Objective   Physical Exam  Vitals and nursing note reviewed.   Constitutional:       General: He is not in acute distress.     Appearance: Normal appearance. He is well-developed. He is not toxic-appearing.   HENT:      Head: Normocephalic and atraumatic.      Right Ear: Tympanic membrane and external ear normal.      Left Ear: Tympanic membrane and external ear normal.      Nose: Nose normal.      Mouth/Throat:      Mouth: Mucous membranes are moist.      Pharynx: Oropharynx is clear. No oropharyngeal exudate or posterior oropharyngeal erythema.      Tonsils: No tonsillar exudate. 2+ on the right.  2+ on the left.   Eyes:      Extraocular Movements: Extraocular movements intact.      Conjunctiva/sclera: Conjunctivae normal.   Cardiovascular:      Rate and Rhythm: Normal rate and regular rhythm.      Pulses: Normal pulses.      Heart sounds: Normal heart sounds. No murmur heard.  Pulmonary:      Effort: Pulmonary effort is normal.      Breath sounds: Normal breath sounds.   Abdominal:      General: Abdomen is flat. Bowel sounds are normal.      Palpations: Abdomen is soft.   Musculoskeletal:      Cervical back: Neck supple.   Feet:      Right foot:      Skin integrity: Skin integrity normal. No ulcer, blister, skin breakdown, erythema, warmth or callus.      Toenail Condition: Right toenails are normal.      Left foot:      Skin integrity: Skin integrity normal. No ulcer, blister, skin breakdown, erythema, warmth or callus.      Toenail Condition: Left toenails are normal.   Lymphadenopathy:      Cervical: No cervical adenopathy.   Skin:     General: Skin is warm and dry.      Capillary Refill: Capillary refill takes more than 3 seconds.      Findings: No rash.   Neurological:      Mental Status: He is alert. Mental status is at baseline.      Sensory: Sensation is intact.   Psychiatric:         Mood and Affect: Mood normal.         Behavior: Behavior normal.         Thought Content: Thought content normal.         Judgment: Judgment normal.         Normal BILATERAL diabetic foot exam: Pulses are palpable, sensation is intact, and there are no ulcers or lesions.     Assessment & Plan  Dyslipidemia associated with type 2 diabetes mellitus  LDL cholesterol optimal at 60 with triglycerides 104 HDL of 38 continue atorvastatin 10 mg daily  Orders:    Comprehensive Metabolic Panel; Future    Hemoglobin A1C; Future    Lipid Panel; Future    Albumin-Creatinine Ratio, Urine Random; Future    Coronary artery disease of native artery of native heart with stable angina pectoris  Patient with established microvascular  disease per cardiac catheterization started on Ranexa 500 mg twice a day by cardiologist reevaluate symptom control also placed on amlodipine 5 mg daily       Type 2 diabetes mellitus with hyperglycemia, without long-term current use of insulin  Hemoglobin A1c stable at 6.1 with fasting blood sugar 131 continue metformin 500 mg twice a day with food consider SGLT2 inhibitor  Orders:    Comprehensive Metabolic Panel; Future    Hemoglobin A1C; Future    Lipid Panel; Future    Albumin-Creatinine Ratio, Urine Random; Future    Follow Up In [facility] - PCP - Established; Future    Complete left bundle branch block (LBBB) determined by ECG  Patient developed intermittent left bundle branch block with stress test resulting in cardiac catheterization with microvascular disease stable       Hypertensive heart disease without heart failure  Optimal blood pressure control at this time with addition of amlodipine 5 mg daily to chlorthalidone 25 mg daily and losartan 100 mg daily beta-blocker contraindicated due to relative bradycardia       Recurrent left inguinal hernia  Referral to general surgery for evaluation and possible repair  Orders:    Referral to General Surgery; Future              Luis Banegas DO 07/09/25 8:09 PM

## 2025-07-07 NOTE — ASSESSMENT & PLAN NOTE
Optimal blood pressure control at this time with addition of amlodipine 5 mg daily to chlorthalidone 25 mg daily and losartan 100 mg daily beta-blocker contraindicated due to relative bradycardia

## 2025-07-18 ENCOUNTER — APPOINTMENT (OUTPATIENT)
Dept: SURGERY | Facility: CLINIC | Age: 75
End: 2025-07-18
Payer: MEDICARE

## 2025-07-18 VITALS
BODY MASS INDEX: 29.59 KG/M2 | HEIGHT: 65 IN | OXYGEN SATURATION: 98 % | WEIGHT: 177.6 LBS | DIASTOLIC BLOOD PRESSURE: 75 MMHG | SYSTOLIC BLOOD PRESSURE: 138 MMHG | HEART RATE: 58 BPM

## 2025-07-18 DIAGNOSIS — R19.04 LEFT LOWER QUADRANT ABDOMINAL SWELLING: Primary | ICD-10-CM

## 2025-07-18 PROCEDURE — 1159F MED LIST DOCD IN RCRD: CPT | Performed by: SURGERY

## 2025-07-18 PROCEDURE — 99203 OFFICE O/P NEW LOW 30 MIN: CPT | Performed by: SURGERY

## 2025-07-18 ASSESSMENT — ENCOUNTER SYMPTOMS
ABDOMINAL PAIN: 1
NAUSEA: 0
CHILLS: 0
DIZZINESS: 0
SHORTNESS OF BREATH: 0
COUGH: 0
PALPITATIONS: 0
CONSTIPATION: 0
VOMITING: 0
FEVER: 0
BLOOD IN STOOL: 0
DIARRHEA: 0
HEADACHES: 0

## 2025-07-18 NOTE — PROGRESS NOTES
GENERAL SURGERY CLINIC NOTE    Jonathan Watts   1950   [id number]     History Of Present Illness  Jonathan Watts is a 75 y.o. male who presents to the office for evaluation of LLQ/inguinal swelling. He underwent open repair of bilateral inguinal hernias with mesh 12/12/24 with Dr. Curtis. He healed reasonably well postoperatively. However, a few weeks ago, he started noticing constant swelling just above his left inguinal incision. There is occasional pinching pain. He feels similar symptoms in his right groin but at a much lower severity.      Past Medical History  He has a past medical history of Abnormal posture (12/04/2020), Anxiety, BPH (benign prostatic hyperplasia), Cataract, Complex regional pain syndrome i of upper limb, bilateral (07/21/2020), Easy bruising, Encounter for screening for malignant neoplasm of prostate (03/16/2021), GERD (gastroesophageal reflux disease), Hearing aid worn, HL (hearing loss), Hyperlipidemia, Hypertension, Impingement syndrome of left shoulder (11/17/2020), Impingement syndrome of right shoulder (03/16/2021), Migraine, unspecified, not intractable, without status migrainosus, Other intervertebral disc degeneration, lumbar region (10/23/2020), Other obesity due to excess calories (03/22/2022), Pain in left leg (09/18/2020), Pain in right hip (11/17/2020), Pain in right leg (08/03/2020), Pain in right leg (10/01/2020), Paresthesia of skin (07/21/2020), Personal history of other diseases of male genital organs, Personal history of other diseases of the circulatory system, Personal history of other diseases of the digestive system, Personal history of other diseases of the nervous system and sense organs, Personal history of other specified conditions (03/02/2020), Personal history of other specified conditions (07/21/2020), Personal history of other specified conditions (09/08/2020), Personal history of other specified conditions (09/18/2020), Polymyalgia rheumatica (Multi)  "(09/08/2020), Radiculopathy, lumbar region (11/17/2020), Spinal stenosis, lumbar region with neurogenic claudication (10/23/2020), Type 2 diabetes mellitus, Unspecified mononeuropathy of bilateral lower limbs (09/08/2020), Vision loss, Weakness (12/04/2020), and Wears partial dentures.    Surgical History  He has a past surgical history that includes Other surgical history (02/27/2020); Cataract extraction w/  intraocular lens implant (Left); Cardiac catheterization (N/A, 05/27/2025); and Hernia repair (Left, 12/12/2024).  Abdominal surgeries: open bilateral inguinal hernia repairs with mesh 12/12/24    Medications  Medications Ordered Prior to Encounter[1]    Allergies  Isosorbide mononitrate     Social History  He reports that he quit smoking about 51 years ago. His smoking use included cigarettes. He started smoking about 55 years ago. He has a 0.4 pack-year smoking history. He has never used smokeless tobacco. He reports that he does not currently use alcohol after a past usage of about 1.0 standard drink of alcohol per week. He reports that he does not use drugs.  Former smoker    Family History  Family History[2]     Review of Systems   Constitutional:  Negative for chills and fever.   Respiratory:  Negative for cough and shortness of breath.    Cardiovascular:  Negative for chest pain and palpitations.   Gastrointestinal:  Positive for abdominal pain. Negative for blood in stool, constipation, diarrhea, nausea and vomiting.   Neurological:  Negative for dizziness and headaches.   All other systems reviewed and are negative.      Last Recorded Vitals  Blood pressure 138/75, pulse 58, height 1.651 m (5' 5\"), weight 80.6 kg (177 lb 9.6 oz), SpO2 98%.     Physical Exam  Constitutional:       General: He is not in acute distress.     Appearance: Normal appearance. He is not ill-appearing.   HENT:      Head: Normocephalic and atraumatic.     Cardiovascular:      Rate and Rhythm: Normal rate and regular rhythm. "   Pulmonary:      Effort: Pulmonary effort is normal. No respiratory distress.      Breath sounds: Normal breath sounds.   Abdominal:      General: There is no distension.      Palpations: Abdomen is soft.      Tenderness: There is no abdominal tenderness. There is no guarding.      Comments: Inguinal incisions well healed. In the [place] marked above the left inguinal incision, there is mild protuberance of the tissue not present on the right. No significant inguinal hernias palpated     Musculoskeletal:         General: No swelling.     Skin:     General: Skin is warm and dry.     Neurological:      Mental Status: He is alert and oriented to person, place, and time. Mental status is at baseline.     Psychiatric:         Mood and Affect: Mood normal.         Behavior: Behavior normal.          Relevant Results  None       Assessment and Plan  75 y.o. male with left lower quadrant/inguinal swelling above the incision several months after undergoing inguinal hernia repair. I discussed that this is not the typical location for hernia recurrence. I recommend undergoing CT A/P for further evaluation of the area. Follow up afterwards to discuss the results and next steps. He expressed his understanding and all questions were answered.     Carmen Kim MD, [facility]  General Surgery        [1]   Current Outpatient Medications on File Prior to Visit   Medication Sig Dispense Refill    amLODIPine (Norvasc) 5 mg tablet Take 1 tablet (5 mg) by mouth once daily. 30 tablet 3    aspirin 81 mg EC tablet Take 1 tablet (81 mg) by mouth once daily.      atorvastatin (Lipitor) 10 mg tablet Take 1 tablet (10 mg) by mouth once daily.      chlorthalidone (Hygroton) 25 mg tablet Take 0.5 tablets (12.5 mg) by mouth once daily.      losartan (Cozaar) 100 mg tablet TAKE 1 TABLET BY MOUTH EVERY DAY 90 tablet 3    metFORMIN (Glucophage) 500 mg tablet TAKE 1 TABLET BY MOUTH TWICE A DAY WITH FOOD 180 tablet 3    omeprazole (PriLOSEC) 20 mg   capsule TAKE 1 CAPSULE BY MOUTH EVERY DAY IN THE MORNING BEFORE BREAKFAST 90 capsule 3    polyethylene glycol (Glycolax, Miralax) 17 gram packet Mix 17 g (one packet) into 6-8 ounces of liquid and drink by mouth once daily. 10 packet 0    ranolazine (Ranexa) 500 mg 12 hr tablet Take 1 tablet (500 mg) by mouth 2 times a day. Do not crush, chew, or split. 60 tablet 11    sertraline (Zoloft) 50 mg tablet Take 1 tablet (50 mg) by mouth once daily. 90 tablet 3    albuterol (Ventolin HFA) 90 mcg/actuation inhaler Inhale 2 puffs every 4 hours if needed for wheezing or shortness of breath. (Patient not taking: Reported on 4/29/2025) 8 g 5     No current facility-administered medications on file prior to visit.   [2]   Family History  Problem Relation Name Age of Onset    Stomach cancer Mother      Stroke Father      Coronary artery disease Father      Stroke Maternal Grandfather

## 2025-07-23 ENCOUNTER — HOSPITAL ENCOUNTER (OUTPATIENT)
Dept: RADIOLOGY | Facility: HOSPITAL | Age: 75
Discharge: HOME | End: 2025-07-23
Payer: MEDICARE

## 2025-07-23 DIAGNOSIS — R19.04 LEFT LOWER QUADRANT ABDOMINAL SWELLING: ICD-10-CM

## 2025-07-23 PROCEDURE — 2550000001 HC RX 255 CONTRASTS: Performed by: SURGERY

## 2025-07-23 PROCEDURE — 74177 CT ABD & PELVIS W/CONTRAST: CPT

## 2025-07-23 PROCEDURE — 74177 CT ABD & PELVIS W/CONTRAST: CPT | Performed by: RADIOLOGY

## 2025-07-23 RX ADMIN — IOHEXOL 75 ML: 350 INJECTION, SOLUTION INTRAVENOUS at 12:25

## 2025-07-28 ENCOUNTER — APPOINTMENT (OUTPATIENT)
Dept: SURGERY | Facility: CLINIC | Age: 75
End: 2025-07-28
Payer: MEDICARE

## 2025-07-28 VITALS
OXYGEN SATURATION: 97 % | HEART RATE: 55 BPM | SYSTOLIC BLOOD PRESSURE: 123 MMHG | WEIGHT: 176.4 LBS | HEIGHT: 65 IN | DIASTOLIC BLOOD PRESSURE: 71 MMHG | BODY MASS INDEX: 29.39 KG/M2

## 2025-07-28 DIAGNOSIS — R19.04 LEFT LOWER QUADRANT ABDOMINAL SWELLING: Primary | ICD-10-CM

## 2025-07-28 PROCEDURE — 1159F MED LIST DOCD IN RCRD: CPT | Performed by: SURGERY

## 2025-07-28 PROCEDURE — 99214 OFFICE O/P EST MOD 30 MIN: CPT | Performed by: SURGERY

## 2025-07-28 ASSESSMENT — ENCOUNTER SYMPTOMS
SHORTNESS OF BREATH: 0
CHILLS: 0
FEVER: 0
DIARRHEA: 0
VOMITING: 0
ABDOMINAL PAIN: 1
COUGH: 0
HEADACHES: 0
NAUSEA: 0
BLOOD IN STOOL: 0
DIZZINESS: 0
PALPITATIONS: 0
CONSTIPATION: 0

## 2025-07-28 NOTE — PROGRESS NOTES
GENERAL SURGERY CLINIC NOTE    Jonathan Watts   1950   [id number]     History Of Present Illness  Jonathan Watts is a 75 y.o. male who presents to the office for follow up of LLQ/inguinal swelling after undergoing CT. He underwent open repair of bilateral inguinal hernias with mesh 12/12/24 with Dr. Curtis. He healed reasonably well postoperatively. However, several weeks ago, he started noticing constant swelling just above his left inguinal incision. There is occasional pinching pain. He feels similar symptoms in his right groin but at a much lower severity. He has been taking NSAIDs PRN without significant improvement.     Past Medical History  He has a past medical history of Abnormal posture (12/04/2020), Anxiety, BPH (benign prostatic hyperplasia), Cataract, Complex regional pain syndrome i of upper limb, bilateral (07/21/2020), Easy bruising, Encounter for screening for malignant neoplasm of prostate (03/16/2021), GERD (gastroesophageal reflux disease), Hearing aid worn, HL (hearing loss), Hyperlipidemia, Hypertension, Impingement syndrome of left shoulder (11/17/2020), Impingement syndrome of right shoulder (03/16/2021), Migraine, unspecified, not intractable, without status migrainosus, Other intervertebral disc degeneration, lumbar region (10/23/2020), Other obesity due to excess calories (03/22/2022), Pain in left leg (09/18/2020), Pain in right hip (11/17/2020), Pain in right leg (08/03/2020), Pain in right leg (10/01/2020), Paresthesia of skin (07/21/2020), Personal history of other diseases of male genital organs, Personal history of other diseases of the circulatory system, Personal history of other diseases of the digestive system, Personal history of other diseases of the nervous system and sense organs, Personal history of other specified conditions (03/02/2020), Personal history of other specified conditions (07/21/2020), Personal history of other specified conditions (09/08/2020), Personal history of  "other specified conditions (09/18/2020), Polymyalgia rheumatica (Multi) (09/08/2020), Radiculopathy, lumbar region (11/17/2020), Spinal stenosis, lumbar region with neurogenic claudication (10/23/2020), Type 2 diabetes mellitus, Unspecified mononeuropathy of bilateral lower limbs (09/08/2020), Vision loss, Weakness (12/04/2020), and Wears partial dentures.    Surgical History  He has a past surgical history that includes Other surgical history (02/27/2020); Cataract extraction w/  intraocular lens implant (Left); Cardiac catheterization (N/A, 05/27/2025); and Hernia repair (Left, 12/12/2024).  Abdominal surgeries: open bilateral inguinal hernia repairs with mesh 12/12/24    Medications  Medications Ordered Prior to Encounter[1]    Allergies  Isosorbide mononitrate     Social History  He reports that he quit smoking about 51 years ago. His smoking use included cigarettes. He started smoking about 55 years ago. He has a 0.4 pack-year smoking history. He has never used smokeless tobacco. He reports that he does not currently use alcohol after a past usage of about 1.0 standard drink of alcohol per week. He reports that he does not use drugs.  Former smoker    Family History  Family History[2]     Review of Systems   Constitutional:  Negative for chills and fever.   Respiratory:  Negative for cough and shortness of breath.    Cardiovascular:  Negative for chest pain and palpitations.   Gastrointestinal:  Positive for abdominal pain. Negative for blood in stool, constipation, diarrhea, nausea and vomiting.   Neurological:  Negative for dizziness and headaches.   All other systems reviewed and are negative.      Last Recorded Vitals  Blood pressure 123/71, pulse 55, height 1.651 m (5' 5\"), weight 80 kg (176 lb 6.4 oz), SpO2 97%.     Physical Exam  Constitutional:       General: He is not in acute distress.     Appearance: Normal appearance. He is not ill-appearing.   HENT:      Head: Normocephalic and atraumatic. "     Cardiovascular:      Rate and Rhythm: Normal rate and regular rhythm.   Pulmonary:      Effort: Pulmonary effort is normal. No respiratory distress.      Breath sounds: Normal breath sounds.   Abdominal:      General: There is no distension.      Palpations: Abdomen is soft.      Tenderness: There is no abdominal tenderness. There is no guarding.      Comments: Inguinal incisions well healed. In the [place] marked above the left inguinal incision, there is mild protuberance of the tissue not present on the right. No significant inguinal hernias palpated     Musculoskeletal:         General: No swelling.     Skin:     General: Skin is warm and dry.     Neurological:      Mental Status: He is alert and oriented to person, place, and time. Mental status is at baseline.     Psychiatric:         Mood and Affect: Mood normal.         Behavior: Behavior normal.          Relevant Results    CT A/P 7/23/25  IMPRESSION:  1.  Postoperative changes of the bilateral inguinal region are noted  related to prior inguinal hernia repair. There is bulging of  intraperitoneal fat against the mesh used for the patient's hernia  repair, right-greater-than-left. However, no definitive evidence of  hernia recurrence.  2. Prostatomegaly. Correlate with serum PSA.  3. Colonic diverticulosis without diverticulitis.    Assessment and Plan  75 y.o. male with left lower quadrant/inguinal swelling above the incision several months after undergoing inguinal hernia repair. I reviewed the CT results with the patient. There is no evidence of hernia recurrence, swelling or infection in the LLQ/L inguinal regions. The pinching discomfort may be related to scar tissue or nerve-related pain. I recommended considering a referral to Pain Management and he wanted to hold off for now. He can return to Dr. Curtis to discuss his symptoms. Follow up with me on an as needed basis. He expressed his understanding and all questions were answered.     Carmen HAMILTON  MD Julio, [facility]  General Surgery          [1]   Current Outpatient Medications on File Prior to Visit   Medication Sig Dispense Refill    albuterol (Ventolin HFA) 90 mcg/actuation inhaler Inhale 2 puffs every 4 hours if needed for wheezing or shortness of breath. (Patient not taking: Reported on 4/29/2025) 8 g 5    amLODIPine (Norvasc) 5 mg tablet Take 1 tablet (5 mg) by mouth once daily. 30 tablet 3    aspirin 81 mg EC tablet Take 1 tablet (81 mg) by mouth once daily.      atorvastatin (Lipitor) 10 mg tablet Take 1 tablet (10 mg) by mouth once daily.      chlorthalidone (Hygroton) 25 mg tablet Take 0.5 tablets (12.5 mg) by mouth once daily.      losartan (Cozaar) 100 mg tablet TAKE 1 TABLET BY MOUTH EVERY DAY 90 tablet 3    metFORMIN (Glucophage) 500 mg tablet TAKE 1 TABLET BY MOUTH TWICE A DAY WITH FOOD 180 tablet 3    omeprazole (PriLOSEC) 20 mg DR capsule TAKE 1 CAPSULE BY MOUTH EVERY DAY IN THE MORNING BEFORE BREAKFAST 90 capsule 3    polyethylene glycol (Glycolax, Miralax) 17 gram packet Mix 17 g (one packet) into 6-8 ounces of liquid and drink by mouth once daily. 10 packet 0    ranolazine (Ranexa) 500 mg 12 hr tablet Take 1 tablet (500 mg) by mouth 2 times a day. Do not crush, chew, or split. 60 tablet 11    sertraline (Zoloft) 50 mg tablet Take 1 tablet (50 mg) by mouth once daily. 90 tablet 3     No current facility-administered medications on file prior to visit.   [2]   Family History  Problem Relation Name Age of Onset    Stomach cancer Mother      Stroke Father      Coronary artery disease Father      Stroke Maternal Grandfather

## 2025-08-04 ENCOUNTER — HOSPITAL ENCOUNTER (OUTPATIENT)
Dept: VASCULAR MEDICINE | Facility: HOSPITAL | Age: 75
Discharge: HOME | End: 2025-08-04
Payer: MEDICARE

## 2025-08-04 DIAGNOSIS — Z23 FLU VACCINE NEED: ICD-10-CM

## 2025-08-04 DIAGNOSIS — I10 ESSENTIAL HYPERTENSION: ICD-10-CM

## 2025-08-04 DIAGNOSIS — I73.9 CLAUDICATION: ICD-10-CM

## 2025-08-04 DIAGNOSIS — E78.5 DYSLIPIDEMIA, GOAL LDL BELOW 100: ICD-10-CM

## 2025-08-04 PROCEDURE — 93924 LWR XTR VASC STDY BILAT: CPT

## 2025-08-04 PROCEDURE — 93924 LWR XTR VASC STDY BILAT: CPT | Performed by: INTERNAL MEDICINE

## 2025-08-04 NOTE — TELEPHONE ENCOUNTER
Patient stopped into office asking for a refill of his atorvastatin (Lipitor) 10 mg tablet [[id number]] and asks it be sent to   [facility]/pharmacy #[id number] - [place], [place] - [address] AT [address] OF [address] [place] [address]  Phone: [phone]  Fax: [phone]

## 2025-08-05 RX ORDER — ATORVASTATIN CALCIUM 10 MG/1
10 TABLET, FILM COATED ORAL DAILY
Qty: 90 TABLET | Refills: 3 | Status: SHIPPED | OUTPATIENT
Start: 2025-08-05 | End: 2026-08-05

## 2025-08-15 ENCOUNTER — RESULTS FOLLOW-UP (OUTPATIENT)
Dept: CARDIOLOGY | Facility: HOSPITAL | Age: 75
End: 2025-08-15
Payer: MEDICARE

## 2025-08-20 ENCOUNTER — TELEPHONE (OUTPATIENT)
Dept: CARDIOLOGY | Facility: HOSPITAL | Age: 75
End: 2025-08-20
Payer: MEDICARE

## 2026-01-07 ENCOUNTER — APPOINTMENT (OUTPATIENT)
Dept: PRIMARY CARE | Facility: CLINIC | Age: 76
End: 2026-01-07
Payer: MEDICARE

## 2026-04-06 ENCOUNTER — APPOINTMENT (OUTPATIENT)
Dept: PRIMARY CARE | Facility: CLINIC | Age: 76
End: 2026-04-06
Payer: MEDICARE

## (undated) DEVICE — SUTURE, PROLENE, 2-0, 30 IN, SH, BLUE

## (undated) DEVICE — DRESSING, GAUZE, VERSALON, 4 PLY, 4 X 4 IN, STERILE

## (undated) DEVICE — SUTURE, VICRYL, 4-0, 18 IN, UNDYED BR PS-2

## (undated) DEVICE — CATHETER, OPTITORQUE, 5FR, TIG, 1H/100CM

## (undated) DEVICE — PAD, GROUNDING, ELECTROSURGICAL, W/9 FT CABLE, POLYHESIVE II, ADULT, LF

## (undated) DEVICE — GLOVE, PROTEXIS PI CLASSIC, SZ-7.5, PF, LF

## (undated) DEVICE — NEEDLE, HYPODERMIC, REGULAR WALL, REGULAR BEVEL, 25 G X 1.5 IN

## (undated) DEVICE — CATHETER, DIAGNOSTIC, DXTERITY, JL 3.5, 100CM

## (undated) DEVICE — SHEATH, GLIDESHEATH, SLENDER, 6FR 10CM

## (undated) DEVICE — Device

## (undated) DEVICE — COVER HANDLE LIGHT, STERIS, BLUE, STERILE

## (undated) DEVICE — PREP TRAY, SKIN, DRY, W/GLOVES

## (undated) DEVICE — DRESSING, TRANSPARENT, TEGADERM, 4 X 4-3/4 IN

## (undated) DEVICE — SOLUTION, IRRIGATION, SODIUM CHLORIDE 0.9%, 1000 ML, POUR BOTTLE

## (undated) DEVICE — GUIDE WIRE, 260CM, HI-TORQUE, VERSACORE, MODIFIED J

## (undated) DEVICE — SUTURE, VICRYL, 2-0, 18 IN, TIE, VIOLET

## (undated) DEVICE — SYRINGE, 10 CC, LUER LOCK

## (undated) DEVICE — DRAPE, SHEET, MINOR PROCEDURE, T, PEDIATRIC, 100X122X77

## (undated) DEVICE — PREP, SCRUB, SKIN, FOAM, HIBICLENS, 4 OZ

## (undated) DEVICE — SUTURE, VICRYL, 2-0, 27 IN, SH, UNDYED

## (undated) DEVICE — SPONGE, LAP, XRAY DECT, 4IN X 18IN, W/MASTER DMT, STERILE

## (undated) DEVICE — DRAIN, PENROSE, 0.5 X 12 IN, LATEX, STERILE

## (undated) DEVICE — TR BAND, RADIAL COMPRESSION, STANDARD, 24CM